# Patient Record
Sex: MALE | Race: WHITE | NOT HISPANIC OR LATINO | Employment: OTHER | URBAN - METROPOLITAN AREA
[De-identification: names, ages, dates, MRNs, and addresses within clinical notes are randomized per-mention and may not be internally consistent; named-entity substitution may affect disease eponyms.]

---

## 2019-06-03 ENCOUNTER — RECORDS - HEALTHEAST (OUTPATIENT)
Dept: LAB | Facility: CLINIC | Age: 79
End: 2019-06-03

## 2019-06-03 LAB
ANION GAP SERPL CALCULATED.3IONS-SCNC: 8 MMOL/L (ref 5–18)
BUN SERPL-MCNC: 10 MG/DL (ref 8–28)
CALCIUM SERPL-MCNC: 9.6 MG/DL (ref 8.5–10.5)
CHLORIDE BLD-SCNC: 109 MMOL/L (ref 98–107)
CO2 SERPL-SCNC: 24 MMOL/L (ref 22–31)
CREAT SERPL-MCNC: 0.73 MG/DL (ref 0.7–1.3)
GFR SERPL CREATININE-BSD FRML MDRD: >60 ML/MIN/1.73M2
GLUCOSE BLD-MCNC: 85 MG/DL (ref 70–125)
HGB BLD-MCNC: 11.8 G/DL (ref 14–18)
POTASSIUM BLD-SCNC: 3.8 MMOL/L (ref 3.5–5)
SODIUM SERPL-SCNC: 141 MMOL/L (ref 136–145)

## 2019-06-17 ENCOUNTER — RECORDS - HEALTHEAST (OUTPATIENT)
Dept: LAB | Facility: CLINIC | Age: 79
End: 2019-06-17

## 2019-06-17 LAB
ANION GAP SERPL CALCULATED.3IONS-SCNC: 8 MMOL/L (ref 5–18)
BUN SERPL-MCNC: 25 MG/DL (ref 8–28)
CALCIUM SERPL-MCNC: 9 MG/DL (ref 8.5–10.5)
CHLORIDE BLD-SCNC: 109 MMOL/L (ref 98–107)
CO2 SERPL-SCNC: 23 MMOL/L (ref 22–31)
CREAT SERPL-MCNC: 0.75 MG/DL (ref 0.7–1.3)
GFR SERPL CREATININE-BSD FRML MDRD: >60 ML/MIN/1.73M2
GLUCOSE BLD-MCNC: 81 MG/DL (ref 70–125)
POTASSIUM BLD-SCNC: 4 MMOL/L (ref 3.5–5)
SODIUM SERPL-SCNC: 140 MMOL/L (ref 136–145)

## 2019-06-26 ENCOUNTER — RECORDS - HEALTHEAST (OUTPATIENT)
Dept: LAB | Facility: CLINIC | Age: 79
End: 2019-06-26

## 2019-06-26 LAB — INR PPP: 1.17 (ref 0.9–1.1)

## 2019-06-27 ENCOUNTER — RECORDS - HEALTHEAST (OUTPATIENT)
Dept: LAB | Facility: CLINIC | Age: 79
End: 2019-06-27

## 2019-06-27 LAB
ANION GAP SERPL CALCULATED.3IONS-SCNC: 9 MMOL/L (ref 5–18)
BUN SERPL-MCNC: 25 MG/DL (ref 8–28)
CALCIUM SERPL-MCNC: 9 MG/DL (ref 8.5–10.5)
CHLORIDE BLD-SCNC: 108 MMOL/L (ref 98–107)
CO2 SERPL-SCNC: 21 MMOL/L (ref 22–31)
CREAT SERPL-MCNC: 0.91 MG/DL (ref 0.7–1.3)
GFR SERPL CREATININE-BSD FRML MDRD: >60 ML/MIN/1.73M2
GLUCOSE BLD-MCNC: 90 MG/DL (ref 70–125)
HGB BLD-MCNC: 11.4 G/DL (ref 14–18)
INR PPP: 1.13 (ref 0.9–1.1)
POTASSIUM BLD-SCNC: 4.1 MMOL/L (ref 3.5–5)
SODIUM SERPL-SCNC: 138 MMOL/L (ref 136–145)

## 2019-06-28 ENCOUNTER — RECORDS - HEALTHEAST (OUTPATIENT)
Dept: LAB | Facility: CLINIC | Age: 79
End: 2019-06-28

## 2019-06-28 LAB — INR PPP: 1.28 (ref 0.9–1.1)

## 2019-06-30 ENCOUNTER — RECORDS - HEALTHEAST (OUTPATIENT)
Dept: LAB | Facility: CLINIC | Age: 79
End: 2019-06-30

## 2019-06-30 LAB — INR PPP: 2.67 (ref 0.9–1.1)

## 2019-07-01 ENCOUNTER — RECORDS - HEALTHEAST (OUTPATIENT)
Dept: LAB | Facility: CLINIC | Age: 79
End: 2019-07-01

## 2019-07-01 LAB — INR PPP: 3.31 (ref 0.9–1.1)

## 2019-07-02 ENCOUNTER — RECORDS - HEALTHEAST (OUTPATIENT)
Dept: LAB | Facility: CLINIC | Age: 79
End: 2019-07-02

## 2019-07-02 LAB — INR PPP: 3.02 (ref 0.9–1.1)

## 2019-07-05 ENCOUNTER — RECORDS - HEALTHEAST (OUTPATIENT)
Dept: LAB | Facility: CLINIC | Age: 79
End: 2019-07-05

## 2019-07-05 LAB — INR PPP: 3.42 (ref 0.9–1.1)

## 2019-07-08 ENCOUNTER — RECORDS - HEALTHEAST (OUTPATIENT)
Dept: LAB | Facility: CLINIC | Age: 79
End: 2019-07-08

## 2019-07-09 LAB — INR PPP: 3.46 (ref 0.9–1.1)

## 2019-07-10 ENCOUNTER — RECORDS - HEALTHEAST (OUTPATIENT)
Dept: LAB | Facility: CLINIC | Age: 79
End: 2019-07-10

## 2019-07-10 LAB
ANION GAP SERPL CALCULATED.3IONS-SCNC: 9 MMOL/L (ref 5–18)
BUN SERPL-MCNC: 22 MG/DL (ref 8–28)
CALCIUM SERPL-MCNC: 9.2 MG/DL (ref 8.5–10.5)
CHLORIDE BLD-SCNC: 107 MMOL/L (ref 98–107)
CO2 SERPL-SCNC: 24 MMOL/L (ref 22–31)
CREAT SERPL-MCNC: 0.76 MG/DL (ref 0.7–1.3)
GFR SERPL CREATININE-BSD FRML MDRD: >60 ML/MIN/1.73M2
GLUCOSE BLD-MCNC: 83 MG/DL (ref 70–125)
INR PPP: 2.77 (ref 0.9–1.1)
POTASSIUM BLD-SCNC: 3.9 MMOL/L (ref 3.5–5)
SODIUM SERPL-SCNC: 140 MMOL/L (ref 136–145)

## 2019-07-12 ENCOUNTER — RECORDS - HEALTHEAST (OUTPATIENT)
Dept: LAB | Facility: CLINIC | Age: 79
End: 2019-07-12

## 2019-07-12 LAB — INR PPP: 1.96 (ref 0.9–1.1)

## 2019-07-15 ENCOUNTER — RECORDS - HEALTHEAST (OUTPATIENT)
Dept: LAB | Facility: CLINIC | Age: 79
End: 2019-07-15

## 2019-07-15 LAB — INR PPP: 2.29 (ref 0.9–1.1)

## 2019-07-16 ENCOUNTER — RECORDS - HEALTHEAST (OUTPATIENT)
Dept: LAB | Facility: CLINIC | Age: 79
End: 2019-07-16

## 2019-07-16 LAB — INR PPP: 2.19 (ref 0.9–1.1)

## 2019-07-18 ENCOUNTER — RECORDS - HEALTHEAST (OUTPATIENT)
Dept: LAB | Facility: CLINIC | Age: 79
End: 2019-07-18

## 2019-07-19 LAB — INR PPP: 2.32 (ref 0.9–1.1)

## 2019-07-25 ENCOUNTER — RECORDS - HEALTHEAST (OUTPATIENT)
Dept: LAB | Facility: CLINIC | Age: 79
End: 2019-07-25

## 2019-07-26 LAB — INR PPP: 1.75 (ref 0.9–1.1)

## 2019-07-29 ENCOUNTER — RECORDS - HEALTHEAST (OUTPATIENT)
Dept: LAB | Facility: CLINIC | Age: 79
End: 2019-07-29

## 2019-07-29 LAB — INR PPP: 1.77 (ref 0.9–1.1)

## 2019-07-31 ENCOUNTER — RECORDS - HEALTHEAST (OUTPATIENT)
Dept: LAB | Facility: CLINIC | Age: 79
End: 2019-07-31

## 2019-08-01 LAB — INR PPP: 1.95 (ref 0.9–1.1)

## 2019-08-05 ENCOUNTER — RECORDS - HEALTHEAST (OUTPATIENT)
Dept: LAB | Facility: CLINIC | Age: 79
End: 2019-08-05

## 2019-08-06 LAB — INR PPP: 1.86 (ref 0.9–1.1)

## 2019-08-07 ENCOUNTER — RECORDS - HEALTHEAST (OUTPATIENT)
Dept: LAB | Facility: CLINIC | Age: 79
End: 2019-08-07

## 2019-08-07 LAB
ANION GAP SERPL CALCULATED.3IONS-SCNC: 6 MMOL/L (ref 5–18)
BUN SERPL-MCNC: 22 MG/DL (ref 8–28)
CALCIUM SERPL-MCNC: 8.6 MG/DL (ref 8.5–10.5)
CHLORIDE BLD-SCNC: 110 MMOL/L (ref 98–107)
CO2 SERPL-SCNC: 24 MMOL/L (ref 22–31)
CREAT SERPL-MCNC: 0.73 MG/DL (ref 0.7–1.3)
GFR SERPL CREATININE-BSD FRML MDRD: >60 ML/MIN/1.73M2
GLUCOSE BLD-MCNC: 96 MG/DL (ref 70–125)
HGB BLD-MCNC: 10.4 G/DL (ref 14–18)
POTASSIUM BLD-SCNC: 4 MMOL/L (ref 3.5–5)
SODIUM SERPL-SCNC: 140 MMOL/L (ref 136–145)

## 2019-08-12 ENCOUNTER — RECORDS - HEALTHEAST (OUTPATIENT)
Dept: LAB | Facility: CLINIC | Age: 79
End: 2019-08-12

## 2019-08-13 LAB — INR PPP: 2.2 (ref 0.9–1.1)

## 2019-08-19 ENCOUNTER — RECORDS - HEALTHEAST (OUTPATIENT)
Dept: LAB | Facility: CLINIC | Age: 79
End: 2019-08-19

## 2019-08-20 LAB — INR PPP: 2.46 (ref 0.9–1.1)

## 2019-08-26 ENCOUNTER — RECORDS - HEALTHEAST (OUTPATIENT)
Dept: LAB | Facility: CLINIC | Age: 79
End: 2019-08-26

## 2019-08-27 LAB — INR PPP: 2.48 (ref 0.9–1.1)

## 2019-08-30 ENCOUNTER — RECORDS - HEALTHEAST (OUTPATIENT)
Dept: LAB | Facility: CLINIC | Age: 79
End: 2019-08-30

## 2019-09-03 LAB — INR PPP: 2.32 (ref 0.9–1.1)

## 2019-09-10 ENCOUNTER — RECORDS - HEALTHEAST (OUTPATIENT)
Dept: LAB | Facility: CLINIC | Age: 79
End: 2019-09-10

## 2019-09-10 LAB — INR PPP: 2.35 (ref 0.9–1.1)

## 2019-09-17 ENCOUNTER — RECORDS - HEALTHEAST (OUTPATIENT)
Dept: LAB | Facility: CLINIC | Age: 79
End: 2019-09-17

## 2019-09-17 LAB — INR PPP: 2.21 (ref 0.9–1.1)

## 2019-09-23 ENCOUNTER — RECORDS - HEALTHEAST (OUTPATIENT)
Dept: LAB | Facility: CLINIC | Age: 79
End: 2019-09-23

## 2019-09-24 LAB — INR PPP: 2.29 (ref 0.9–1.1)

## 2019-10-07 ENCOUNTER — RECORDS - HEALTHEAST (OUTPATIENT)
Dept: LAB | Facility: CLINIC | Age: 79
End: 2019-10-07

## 2019-10-08 LAB — INR PPP: 2.79 (ref 0.9–1.1)

## 2019-10-21 ENCOUNTER — RECORDS - HEALTHEAST (OUTPATIENT)
Dept: LAB | Facility: CLINIC | Age: 79
End: 2019-10-21

## 2019-10-22 LAB — INR PPP: 2.91 (ref 0.9–1.1)

## 2019-11-19 ENCOUNTER — RECORDS - HEALTHEAST (OUTPATIENT)
Dept: LAB | Facility: CLINIC | Age: 79
End: 2019-11-19

## 2019-11-19 LAB — INR PPP: 2.75 (ref 0.9–1.1)

## 2019-12-16 ENCOUNTER — RECORDS - HEALTHEAST (OUTPATIENT)
Dept: LAB | Facility: CLINIC | Age: 79
End: 2019-12-16

## 2019-12-17 LAB — INR PPP: 1.74 (ref 0.9–1.1)

## 2019-12-20 ENCOUNTER — RECORDS - HEALTHEAST (OUTPATIENT)
Dept: LAB | Facility: CLINIC | Age: 79
End: 2019-12-20

## 2019-12-20 LAB — INR PPP: 1.71 (ref 0.9–1.1)

## 2020-01-06 ENCOUNTER — RECORDS - HEALTHEAST (OUTPATIENT)
Dept: LAB | Facility: CLINIC | Age: 80
End: 2020-01-06

## 2020-01-07 LAB — INR PPP: 2.91 (ref 0.9–1.1)

## 2020-01-20 ENCOUNTER — RECORDS - HEALTHEAST (OUTPATIENT)
Dept: LAB | Facility: CLINIC | Age: 80
End: 2020-01-20

## 2020-01-21 LAB
25(OH)D3 SERPL-MCNC: 52.4 NG/ML (ref 30–80)
ANION GAP SERPL CALCULATED.3IONS-SCNC: 6 MMOL/L (ref 5–18)
BASOPHILS # BLD AUTO: 0.1 THOU/UL (ref 0–0.2)
BASOPHILS NFR BLD AUTO: 1 % (ref 0–2)
BNP SERPL-MCNC: 49 PG/ML (ref 0–88)
BUN SERPL-MCNC: 19 MG/DL (ref 8–28)
CALCIUM SERPL-MCNC: 8.9 MG/DL (ref 8.5–10.5)
CHLORIDE BLD-SCNC: 106 MMOL/L (ref 98–107)
CHOLEST SERPL-MCNC: 138 MG/DL
CO2 SERPL-SCNC: 27 MMOL/L (ref 22–31)
CREAT SERPL-MCNC: 0.87 MG/DL (ref 0.7–1.3)
EOSINOPHIL # BLD AUTO: 1.1 THOU/UL (ref 0–0.4)
EOSINOPHIL NFR BLD AUTO: 12 % (ref 0–6)
ERYTHROCYTE [DISTWIDTH] IN BLOOD BY AUTOMATED COUNT: 15.1 % (ref 11–14.5)
FASTING STATUS PATIENT QL REPORTED: NORMAL
GFR SERPL CREATININE-BSD FRML MDRD: >60 ML/MIN/1.73M2
GLUCOSE BLD-MCNC: 90 MG/DL (ref 70–125)
HCT VFR BLD AUTO: 37.7 % (ref 40–54)
HDLC SERPL-MCNC: 44 MG/DL
HGB BLD-MCNC: 12 G/DL (ref 14–18)
LDLC SERPL CALC-MCNC: 79 MG/DL
LYMPHOCYTES # BLD AUTO: 1 THOU/UL (ref 0.8–4.4)
LYMPHOCYTES NFR BLD AUTO: 12 % (ref 20–40)
MCH RBC QN AUTO: 31.7 PG (ref 27–34)
MCHC RBC AUTO-ENTMCNC: 31.8 G/DL (ref 32–36)
MCV RBC AUTO: 100 FL (ref 80–100)
MONOCYTES # BLD AUTO: 0.9 THOU/UL (ref 0–0.9)
MONOCYTES NFR BLD AUTO: 10 % (ref 2–10)
NEUTROPHILS # BLD AUTO: 5.5 THOU/UL (ref 2–7.7)
NEUTROPHILS NFR BLD AUTO: 64 % (ref 50–70)
PLATELET # BLD AUTO: 324 THOU/UL (ref 140–440)
PMV BLD AUTO: 10.7 FL (ref 8.5–12.5)
POTASSIUM BLD-SCNC: 4.7 MMOL/L (ref 3.5–5)
RBC # BLD AUTO: 3.79 MILL/UL (ref 4.4–6.2)
SODIUM SERPL-SCNC: 139 MMOL/L (ref 136–145)
TRIGL SERPL-MCNC: 75 MG/DL
TSH SERPL DL<=0.005 MIU/L-ACNC: 5.58 UIU/ML (ref 0.3–5)
WBC: 8.5 THOU/UL (ref 4–11)

## 2020-02-03 ENCOUNTER — RECORDS - HEALTHEAST (OUTPATIENT)
Dept: LAB | Facility: CLINIC | Age: 80
End: 2020-02-03

## 2020-02-04 LAB
INR PPP: 3.28 (ref 0.9–1.1)
T4 FREE SERPL-MCNC: 0.8 NG/DL (ref 0.7–1.8)
TSH SERPL DL<=0.005 MIU/L-ACNC: 4.88 UIU/ML (ref 0.3–5)

## 2020-02-10 ENCOUNTER — RECORDS - HEALTHEAST (OUTPATIENT)
Dept: LAB | Facility: CLINIC | Age: 80
End: 2020-02-10

## 2020-02-11 LAB — INR PPP: 3.02 (ref 0.9–1.1)

## 2020-03-09 ENCOUNTER — RECORDS - HEALTHEAST (OUTPATIENT)
Dept: LAB | Facility: CLINIC | Age: 80
End: 2020-03-09

## 2020-03-10 LAB — INR PPP: 2.98 (ref 0.9–1.1)

## 2020-04-07 ENCOUNTER — RECORDS - HEALTHEAST (OUTPATIENT)
Dept: LAB | Facility: CLINIC | Age: 80
End: 2020-04-07

## 2020-04-07 LAB — INR PPP: 4.04 (ref 0.9–1.1)

## 2020-04-08 ENCOUNTER — RECORDS - HEALTHEAST (OUTPATIENT)
Dept: LAB | Facility: CLINIC | Age: 80
End: 2020-04-08

## 2020-04-09 LAB — INR PPP: 2.33 (ref 0.9–1.1)

## 2020-04-13 ENCOUNTER — RECORDS - HEALTHEAST (OUTPATIENT)
Dept: LAB | Facility: CLINIC | Age: 80
End: 2020-04-13

## 2020-04-13 LAB — INR PPP: 2.83 (ref 0.9–1.1)

## 2020-04-15 ENCOUNTER — RECORDS - HEALTHEAST (OUTPATIENT)
Dept: LAB | Facility: CLINIC | Age: 80
End: 2020-04-15

## 2020-04-16 LAB — INR PPP: 2.98 (ref 0.9–1.1)

## 2020-04-22 ENCOUNTER — RECORDS - HEALTHEAST (OUTPATIENT)
Dept: LAB | Facility: CLINIC | Age: 80
End: 2020-04-22

## 2020-04-23 LAB — INR PPP: 2.82 (ref 0.9–1.1)

## 2020-04-29 ENCOUNTER — RECORDS - HEALTHEAST (OUTPATIENT)
Dept: LAB | Facility: CLINIC | Age: 80
End: 2020-04-29

## 2020-04-30 LAB — INR PPP: 2.74 (ref 0.9–1.1)

## 2020-05-14 ENCOUNTER — RECORDS - HEALTHEAST (OUTPATIENT)
Dept: LAB | Facility: CLINIC | Age: 80
End: 2020-05-14

## 2020-05-14 LAB — INR PPP: 2.43 (ref 0.9–1.1)

## 2020-05-28 ENCOUNTER — RECORDS - HEALTHEAST (OUTPATIENT)
Dept: LAB | Facility: CLINIC | Age: 80
End: 2020-05-28

## 2020-05-28 LAB — INR PPP: 2.08 (ref 0.9–1.1)

## 2020-06-17 ENCOUNTER — RECORDS - HEALTHEAST (OUTPATIENT)
Dept: LAB | Facility: CLINIC | Age: 80
End: 2020-06-17

## 2020-06-18 LAB — INR PPP: 1.92 (ref 0.9–1.1)

## 2020-07-09 ENCOUNTER — RECORDS - HEALTHEAST (OUTPATIENT)
Dept: LAB | Facility: CLINIC | Age: 80
End: 2020-07-09

## 2020-07-09 LAB — INR PPP: 2.37 (ref 0.9–1.1)

## 2020-07-29 ENCOUNTER — RECORDS - HEALTHEAST (OUTPATIENT)
Dept: LAB | Facility: CLINIC | Age: 80
End: 2020-07-29

## 2020-07-30 ENCOUNTER — RECORDS - HEALTHEAST (OUTPATIENT)
Dept: LAB | Facility: CLINIC | Age: 80
End: 2020-07-30

## 2020-07-30 LAB — INR PPP: 2.25 (ref 0.9–1.1)

## 2020-07-31 LAB — INR PPP: 2.43 (ref 0.9–1.1)

## 2020-08-20 ENCOUNTER — RECORDS - HEALTHEAST (OUTPATIENT)
Dept: LAB | Facility: CLINIC | Age: 80
End: 2020-08-20

## 2020-08-21 LAB — INR PPP: 2.71 (ref 0.9–1.1)

## 2020-08-27 ENCOUNTER — RECORDS - HEALTHEAST (OUTPATIENT)
Dept: LAB | Facility: CLINIC | Age: 80
End: 2020-08-27

## 2020-08-28 LAB
ANION GAP SERPL CALCULATED.3IONS-SCNC: 9 MMOL/L (ref 5–18)
BASOPHILS # BLD AUTO: 0.1 THOU/UL (ref 0–0.2)
BASOPHILS NFR BLD AUTO: 1 % (ref 0–2)
BNP SERPL-MCNC: 66 PG/ML (ref 0–88)
BUN SERPL-MCNC: 20 MG/DL (ref 8–28)
CALCIUM SERPL-MCNC: 9 MG/DL (ref 8.5–10.5)
CHLORIDE BLD-SCNC: 104 MMOL/L (ref 98–107)
CO2 SERPL-SCNC: 25 MMOL/L (ref 22–31)
CREAT SERPL-MCNC: 0.93 MG/DL (ref 0.7–1.3)
EOSINOPHIL # BLD AUTO: 1.3 THOU/UL (ref 0–0.4)
EOSINOPHIL NFR BLD AUTO: 12 % (ref 0–6)
ERYTHROCYTE [DISTWIDTH] IN BLOOD BY AUTOMATED COUNT: 14.2 % (ref 11–14.5)
GFR SERPL CREATININE-BSD FRML MDRD: >60 ML/MIN/1.73M2
GLUCOSE BLD-MCNC: 91 MG/DL (ref 70–125)
HCT VFR BLD AUTO: 39.8 % (ref 40–54)
HGB BLD-MCNC: 12.5 G/DL (ref 14–18)
IMM GRANULOCYTES # BLD: 0 THOU/UL
IMM GRANULOCYTES NFR BLD: 0 %
LYMPHOCYTES # BLD AUTO: 1.2 THOU/UL (ref 0.8–4.4)
LYMPHOCYTES NFR BLD AUTO: 11 % (ref 20–40)
MCH RBC QN AUTO: 31 PG (ref 27–34)
MCHC RBC AUTO-ENTMCNC: 31.4 G/DL (ref 32–36)
MCV RBC AUTO: 99 FL (ref 80–100)
MONOCYTES # BLD AUTO: 0.9 THOU/UL (ref 0–0.9)
MONOCYTES NFR BLD AUTO: 9 % (ref 2–10)
NEUTROPHILS # BLD AUTO: 7 THOU/UL (ref 2–7.7)
NEUTROPHILS NFR BLD AUTO: 67 % (ref 50–70)
PLATELET # BLD AUTO: 355 THOU/UL (ref 140–440)
PMV BLD AUTO: 10.9 FL (ref 8.5–12.5)
POTASSIUM BLD-SCNC: 4.3 MMOL/L (ref 3.5–5)
RBC # BLD AUTO: 4.03 MILL/UL (ref 4.4–6.2)
SODIUM SERPL-SCNC: 138 MMOL/L (ref 136–145)
TSH SERPL DL<=0.005 MIU/L-ACNC: 3.66 UIU/ML (ref 0.3–5)
WBC: 10.5 THOU/UL (ref 4–11)

## 2020-09-10 ENCOUNTER — RECORDS - HEALTHEAST (OUTPATIENT)
Dept: LAB | Facility: CLINIC | Age: 80
End: 2020-09-10

## 2020-09-11 LAB — INR PPP: 2.55 (ref 0.9–1.1)

## 2020-09-24 ENCOUNTER — RECORDS - HEALTHEAST (OUTPATIENT)
Dept: LAB | Facility: CLINIC | Age: 80
End: 2020-09-24

## 2020-09-25 LAB — INR PPP: 1.46 (ref 0.9–1.1)

## 2020-09-27 ENCOUNTER — RECORDS - HEALTHEAST (OUTPATIENT)
Dept: LAB | Facility: CLINIC | Age: 80
End: 2020-09-27

## 2020-09-28 LAB — INR PPP: 1.89 (ref 0.9–1.1)

## 2020-10-01 ENCOUNTER — RECORDS - HEALTHEAST (OUTPATIENT)
Dept: LAB | Facility: CLINIC | Age: 80
End: 2020-10-01

## 2020-10-02 LAB — INR PPP: 2.1 (ref 0.9–1.1)

## 2020-10-08 ENCOUNTER — RECORDS - HEALTHEAST (OUTPATIENT)
Dept: LAB | Facility: CLINIC | Age: 80
End: 2020-10-08

## 2020-10-09 LAB — INR PPP: 2.92 (ref 0.9–1.1)

## 2020-10-15 ENCOUNTER — RECORDS - HEALTHEAST (OUTPATIENT)
Dept: LAB | Facility: CLINIC | Age: 80
End: 2020-10-15

## 2020-10-16 LAB — INR PPP: 1.98 (ref 0.9–1.1)

## 2020-10-22 ENCOUNTER — RECORDS - HEALTHEAST (OUTPATIENT)
Dept: LAB | Facility: CLINIC | Age: 80
End: 2020-10-22

## 2020-10-23 ENCOUNTER — RECORDS - HEALTHEAST (OUTPATIENT)
Dept: LAB | Facility: CLINIC | Age: 80
End: 2020-10-23

## 2020-10-23 LAB — INR PPP: 1.65 (ref 0.9–1.1)

## 2020-10-26 LAB — INR PPP: 2.11 (ref 0.9–1.1)

## 2020-11-02 ENCOUNTER — RECORDS - HEALTHEAST (OUTPATIENT)
Dept: LAB | Facility: CLINIC | Age: 80
End: 2020-11-02

## 2020-11-03 LAB — INR PPP: 1.97 (ref 0.9–1.1)

## 2020-11-09 ENCOUNTER — RECORDS - HEALTHEAST (OUTPATIENT)
Dept: LAB | Facility: CLINIC | Age: 80
End: 2020-11-09

## 2020-11-10 LAB — INR PPP: 2.13 (ref 0.9–1.1)

## 2020-11-17 ENCOUNTER — RECORDS - HEALTHEAST (OUTPATIENT)
Dept: LAB | Facility: CLINIC | Age: 80
End: 2020-11-17

## 2020-11-18 LAB — INR PPP: 2.45 (ref 0.9–1.1)

## 2020-12-01 ENCOUNTER — RECORDS - HEALTHEAST (OUTPATIENT)
Dept: LAB | Facility: CLINIC | Age: 80
End: 2020-12-01

## 2020-12-02 ENCOUNTER — RECORDS - HEALTHEAST (OUTPATIENT)
Dept: LAB | Facility: CLINIC | Age: 80
End: 2020-12-02

## 2020-12-02 LAB — SODIUM SERPL-SCNC: 138 MMOL/L (ref 136–145)

## 2020-12-03 LAB — INR PPP: 2.29 (ref 0.9–1.1)

## 2020-12-16 ENCOUNTER — RECORDS - HEALTHEAST (OUTPATIENT)
Dept: LAB | Facility: CLINIC | Age: 80
End: 2020-12-16

## 2020-12-17 LAB
ANION GAP SERPL CALCULATED.3IONS-SCNC: 8 MMOL/L (ref 5–18)
BASOPHILS # BLD AUTO: 0.1 THOU/UL (ref 0–0.2)
BASOPHILS NFR BLD AUTO: 1 % (ref 0–2)
BUN SERPL-MCNC: 26 MG/DL (ref 8–28)
CALCIUM SERPL-MCNC: 8.6 MG/DL (ref 8.5–10.5)
CHLORIDE BLD-SCNC: 110 MMOL/L (ref 98–107)
CO2 SERPL-SCNC: 23 MMOL/L (ref 22–31)
CREAT SERPL-MCNC: 0.8 MG/DL (ref 0.7–1.3)
EOSINOPHIL # BLD AUTO: 1.5 THOU/UL (ref 0–0.4)
EOSINOPHIL NFR BLD AUTO: 15 % (ref 0–6)
ERYTHROCYTE [DISTWIDTH] IN BLOOD BY AUTOMATED COUNT: 14.5 % (ref 11–14.5)
GFR SERPL CREATININE-BSD FRML MDRD: >60 ML/MIN/1.73M2
GLUCOSE BLD-MCNC: 96 MG/DL (ref 70–125)
HCT VFR BLD AUTO: 37.1 % (ref 40–54)
HGB BLD-MCNC: 11.7 G/DL (ref 14–18)
IMM GRANULOCYTES # BLD: 0 THOU/UL
IMM GRANULOCYTES NFR BLD: 0 %
INR PPP: 2.8 (ref 0.9–1.1)
LYMPHOCYTES # BLD AUTO: 1.2 THOU/UL (ref 0.8–4.4)
LYMPHOCYTES NFR BLD AUTO: 13 % (ref 20–40)
MCH RBC QN AUTO: 30.7 PG (ref 27–34)
MCHC RBC AUTO-ENTMCNC: 31.5 G/DL (ref 32–36)
MCV RBC AUTO: 97 FL (ref 80–100)
MONOCYTES # BLD AUTO: 0.9 THOU/UL (ref 0–0.9)
MONOCYTES NFR BLD AUTO: 9 % (ref 2–10)
NEUTROPHILS # BLD AUTO: 5.9 THOU/UL (ref 2–7.7)
NEUTROPHILS NFR BLD AUTO: 62 % (ref 50–70)
PLATELET # BLD AUTO: 363 THOU/UL (ref 140–440)
PMV BLD AUTO: 11 FL (ref 8.5–12.5)
POTASSIUM BLD-SCNC: 4.6 MMOL/L (ref 3.5–5)
RBC # BLD AUTO: 3.81 MILL/UL (ref 4.4–6.2)
SODIUM SERPL-SCNC: 141 MMOL/L (ref 136–145)
URATE SERPL-MCNC: 4 MG/DL (ref 3–8)
WBC: 9.6 THOU/UL (ref 4–11)

## 2020-12-28 ENCOUNTER — RECORDS - HEALTHEAST (OUTPATIENT)
Dept: LAB | Facility: CLINIC | Age: 80
End: 2020-12-28

## 2020-12-31 LAB — INR PPP: 2.97 (ref 0.9–1.1)

## 2021-01-11 ENCOUNTER — RECORDS - HEALTHEAST (OUTPATIENT)
Dept: LAB | Facility: CLINIC | Age: 81
End: 2021-01-11

## 2021-01-14 LAB — INR PPP: 2.03 (ref 0.9–1.1)

## 2021-01-26 ENCOUNTER — RECORDS - HEALTHEAST (OUTPATIENT)
Dept: LAB | Facility: CLINIC | Age: 81
End: 2021-01-26

## 2021-01-28 LAB — INR PPP: 1.54 (ref 0.9–1.1)

## 2021-01-30 ENCOUNTER — RECORDS - HEALTHEAST (OUTPATIENT)
Dept: LAB | Facility: CLINIC | Age: 81
End: 2021-01-30

## 2021-02-01 LAB — INR PPP: 1.94 (ref 0.9–1.1)

## 2021-02-03 ENCOUNTER — RECORDS - HEALTHEAST (OUTPATIENT)
Dept: LAB | Facility: CLINIC | Age: 81
End: 2021-02-03

## 2021-02-05 LAB — INR PPP: 2.34 (ref 0.9–1.1)

## 2021-02-08 ENCOUNTER — RECORDS - HEALTHEAST (OUTPATIENT)
Dept: LAB | Facility: CLINIC | Age: 81
End: 2021-02-08

## 2021-02-09 LAB — INR PPP: 3.9 (ref 0.9–1.1)

## 2021-02-11 ENCOUNTER — RECORDS - HEALTHEAST (OUTPATIENT)
Dept: LAB | Facility: CLINIC | Age: 81
End: 2021-02-11

## 2021-02-12 LAB — INR PPP: 3.65 (ref 0.9–1.1)

## 2021-02-13 ENCOUNTER — RECORDS - HEALTHEAST (OUTPATIENT)
Dept: LAB | Facility: CLINIC | Age: 81
End: 2021-02-13

## 2021-02-15 LAB — INR PPP: 2.21 (ref 0.9–1.1)

## 2021-02-18 ENCOUNTER — RECORDS - HEALTHEAST (OUTPATIENT)
Dept: LAB | Facility: CLINIC | Age: 81
End: 2021-02-18

## 2021-02-19 LAB — INR PPP: 2.82 (ref 0.9–1.1)

## 2021-02-24 ENCOUNTER — RECORDS - HEALTHEAST (OUTPATIENT)
Dept: LAB | Facility: CLINIC | Age: 81
End: 2021-02-24

## 2021-02-26 LAB — INR PPP: 2.5 (ref 0.9–1.1)

## 2021-03-06 ENCOUNTER — RECORDS - HEALTHEAST (OUTPATIENT)
Dept: LAB | Facility: CLINIC | Age: 81
End: 2021-03-06

## 2021-03-08 LAB — INR PPP: 2.6 (ref 0.9–1.1)

## 2021-03-21 ENCOUNTER — RECORDS - HEALTHEAST (OUTPATIENT)
Dept: LAB | Facility: CLINIC | Age: 81
End: 2021-03-21

## 2021-03-23 LAB — INR PPP: 2.36 (ref 0.9–1.1)

## 2021-04-05 ENCOUNTER — RECORDS - HEALTHEAST (OUTPATIENT)
Dept: LAB | Facility: CLINIC | Age: 81
End: 2021-04-05

## 2021-04-06 LAB
INR PPP: 2.31 (ref 0.9–1.1)
URATE SERPL-MCNC: 4.8 MG/DL (ref 3–8)

## 2021-04-26 ENCOUNTER — RECORDS - HEALTHEAST (OUTPATIENT)
Dept: LAB | Facility: CLINIC | Age: 81
End: 2021-04-26

## 2021-04-27 LAB — INR PPP: 2.27 (ref 0.9–1.1)

## 2021-05-16 ENCOUNTER — RECORDS - HEALTHEAST (OUTPATIENT)
Dept: LAB | Facility: CLINIC | Age: 81
End: 2021-05-16

## 2021-05-18 LAB — INR PPP: 2.4 (ref 0.9–1.1)

## 2021-06-12 ENCOUNTER — RECORDS - HEALTHEAST (OUTPATIENT)
Dept: LAB | Facility: CLINIC | Age: 81
End: 2021-06-12

## 2021-06-14 LAB — INR PPP: 2.14 (ref 0.9–1.1)

## 2021-06-16 PROBLEM — I10 ESSENTIAL HYPERTENSION: Status: ACTIVE | Noted: 2019-01-08

## 2021-06-16 PROBLEM — G11.9 CEREBELLAR ATAXIA (H): Status: ACTIVE | Noted: 2019-01-08

## 2021-06-20 ENCOUNTER — RECORDS - HEALTHEAST (OUTPATIENT)
Dept: LAB | Facility: CLINIC | Age: 81
End: 2021-06-20

## 2021-06-22 LAB
ANION GAP SERPL CALCULATED.3IONS-SCNC: 8 MMOL/L (ref 5–18)
BASOPHILS # BLD AUTO: 0.1 THOU/UL (ref 0–0.2)
BASOPHILS NFR BLD AUTO: 1 % (ref 0–2)
BUN SERPL-MCNC: 20 MG/DL (ref 8–28)
CALCIUM SERPL-MCNC: 8.8 MG/DL (ref 8.5–10.5)
CHLORIDE BLD-SCNC: 107 MMOL/L (ref 98–107)
CO2 SERPL-SCNC: 25 MMOL/L (ref 22–31)
CREAT SERPL-MCNC: 0.92 MG/DL (ref 0.7–1.3)
EOSINOPHIL # BLD AUTO: 1.1 THOU/UL (ref 0–0.4)
EOSINOPHIL NFR BLD AUTO: 12 % (ref 0–6)
ERYTHROCYTE [DISTWIDTH] IN BLOOD BY AUTOMATED COUNT: 13.7 % (ref 11–14.5)
GFR SERPL CREATININE-BSD FRML MDRD: >60 ML/MIN/1.73M2
GLUCOSE BLD-MCNC: 93 MG/DL (ref 70–125)
HCT VFR BLD AUTO: 37.3 % (ref 40–54)
HGB BLD-MCNC: 12.2 G/DL (ref 14–18)
IMM GRANULOCYTES # BLD: 0 THOU/UL
IMM GRANULOCYTES NFR BLD: 0 %
INR PPP: 1.9 (ref 0.9–1.1)
LYMPHOCYTES # BLD AUTO: 1.1 THOU/UL (ref 0.8–4.4)
LYMPHOCYTES NFR BLD AUTO: 12 % (ref 20–40)
MCH RBC QN AUTO: 31.5 PG (ref 27–34)
MCHC RBC AUTO-ENTMCNC: 32.7 G/DL (ref 32–36)
MCV RBC AUTO: 96 FL (ref 80–100)
MONOCYTES # BLD AUTO: 1.1 THOU/UL (ref 0–0.9)
MONOCYTES NFR BLD AUTO: 11 % (ref 2–10)
NEUTROPHILS # BLD AUTO: 6 THOU/UL (ref 2–7.7)
NEUTROPHILS NFR BLD AUTO: 64 % (ref 50–70)
PLATELET # BLD AUTO: 339 THOU/UL (ref 140–440)
PMV BLD AUTO: 10.5 FL (ref 8.5–12.5)
POTASSIUM BLD-SCNC: 4.5 MMOL/L (ref 3.5–5)
RBC # BLD AUTO: 3.87 MILL/UL (ref 4.4–6.2)
SODIUM SERPL-SCNC: 140 MMOL/L (ref 136–145)
URATE SERPL-MCNC: 5.8 MG/DL (ref 3–8)
WBC: 9.4 THOU/UL (ref 4–11)

## 2021-08-14 ENCOUNTER — LAB REQUISITION (OUTPATIENT)
Dept: LAB | Facility: CLINIC | Age: 81
End: 2021-08-14
Payer: MEDICARE

## 2021-08-14 DIAGNOSIS — B02.0 ZOSTER ENCEPHALITIS: ICD-10-CM

## 2021-08-14 DIAGNOSIS — D64.9 ANEMIA, UNSPECIFIED: ICD-10-CM

## 2021-08-17 ENCOUNTER — LAB REQUISITION (OUTPATIENT)
Dept: LAB | Facility: CLINIC | Age: 81
End: 2021-08-17
Payer: MEDICARE

## 2021-08-17 DIAGNOSIS — D64.9 ANEMIA, UNSPECIFIED: ICD-10-CM

## 2021-08-17 DIAGNOSIS — B02.0 ZOSTER ENCEPHALITIS: ICD-10-CM

## 2021-08-17 LAB
BASOPHILS # BLD AUTO: 0.1 10E3/UL (ref 0–0.2)
BASOPHILS NFR BLD AUTO: 1 %
EOSINOPHIL # BLD AUTO: 1.3 10E3/UL (ref 0–0.7)
EOSINOPHIL NFR BLD AUTO: 13 %
ERYTHROCYTE [DISTWIDTH] IN BLOOD BY AUTOMATED COUNT: 13.7 % (ref 10–15)
HCT VFR BLD AUTO: 36 % (ref 40–53)
HGB BLD-MCNC: 11.7 G/DL (ref 13.3–17.7)
IMM GRANULOCYTES # BLD: 0 10E3/UL
IMM GRANULOCYTES NFR BLD: 0 %
LYMPHOCYTES # BLD AUTO: 1.1 10E3/UL (ref 0.8–5.3)
LYMPHOCYTES NFR BLD AUTO: 12 %
MCH RBC QN AUTO: 31.2 PG (ref 26.5–33)
MCHC RBC AUTO-ENTMCNC: 32.5 G/DL (ref 31.5–36.5)
MCV RBC AUTO: 96 FL (ref 78–100)
MONOCYTES # BLD AUTO: 1 10E3/UL (ref 0–1.3)
MONOCYTES NFR BLD AUTO: 10 %
NEUTROPHILS # BLD AUTO: 6.2 10E3/UL (ref 1.6–8.3)
NEUTROPHILS NFR BLD AUTO: 64 %
NRBC # BLD AUTO: 0 10E3/UL
NRBC BLD AUTO-RTO: 0 /100
PLATELET # BLD AUTO: 353 10E3/UL (ref 150–450)
RBC # BLD AUTO: 3.75 10E6/UL (ref 4.4–5.9)
WBC # BLD AUTO: 9.7 10E3/UL (ref 4–11)

## 2021-08-17 PROCEDURE — 36415 COLL VENOUS BLD VENIPUNCTURE: CPT | Mod: ORL | Performed by: NURSE PRACTITIONER

## 2021-08-17 PROCEDURE — 85025 COMPLETE CBC W/AUTO DIFF WBC: CPT | Mod: ORL | Performed by: NURSE PRACTITIONER

## 2021-08-17 PROCEDURE — P9603 ONE-WAY ALLOW PRORATED MILES: HCPCS | Mod: ORL | Performed by: NURSE PRACTITIONER

## 2021-08-18 LAB
ALBUMIN SERPL-MCNC: 3.3 G/DL (ref 3.5–5)
ALP SERPL-CCNC: 61 U/L (ref 45–120)
ALT SERPL W P-5'-P-CCNC: 18 U/L (ref 0–45)
AST SERPL W P-5'-P-CCNC: 22 U/L (ref 0–40)
BILIRUB DIRECT SERPL-MCNC: 0.1 MG/DL
BILIRUB SERPL-MCNC: 0.3 MG/DL (ref 0–1)
PROT SERPL-MCNC: 6.7 G/DL (ref 6–8)

## 2021-08-18 PROCEDURE — 36415 COLL VENOUS BLD VENIPUNCTURE: CPT | Mod: ORL | Performed by: NURSE PRACTITIONER

## 2021-08-18 PROCEDURE — 80076 HEPATIC FUNCTION PANEL: CPT | Mod: ORL | Performed by: NURSE PRACTITIONER

## 2021-08-18 PROCEDURE — P9603 ONE-WAY ALLOW PRORATED MILES: HCPCS | Mod: ORL | Performed by: NURSE PRACTITIONER

## 2021-12-07 ENCOUNTER — MEDICAL CORRESPONDENCE (OUTPATIENT)
Dept: HEALTH INFORMATION MANAGEMENT | Facility: CLINIC | Age: 81
End: 2021-12-07

## 2022-01-01 ENCOUNTER — LAB REQUISITION (OUTPATIENT)
Dept: LAB | Facility: CLINIC | Age: 82
End: 2022-01-01
Payer: MEDICARE

## 2022-01-01 DIAGNOSIS — M1A.00X0 IDIOPATHIC CHRONIC GOUT, UNSPECIFIED SITE, WITHOUT TOPHUS (TOPHI): ICD-10-CM

## 2022-01-01 DIAGNOSIS — D64.9 ANEMIA, UNSPECIFIED: ICD-10-CM

## 2022-01-01 DIAGNOSIS — R29.6 REPEATED FALLS: ICD-10-CM

## 2022-01-01 DIAGNOSIS — I10 ESSENTIAL (PRIMARY) HYPERTENSION: ICD-10-CM

## 2022-01-01 DIAGNOSIS — E78.00 PURE HYPERCHOLESTEROLEMIA, UNSPECIFIED: ICD-10-CM

## 2022-01-01 DIAGNOSIS — M10.9 GOUT, UNSPECIFIED: ICD-10-CM

## 2022-01-01 LAB
ANION GAP SERPL CALCULATED.3IONS-SCNC: 10 MMOL/L (ref 5–18)
BUN SERPL-MCNC: 21 MG/DL (ref 8–28)
CALCIUM SERPL-MCNC: 9.3 MG/DL (ref 8.5–10.5)
CHLORIDE BLD-SCNC: 104 MMOL/L (ref 98–107)
CHOLEST SERPL-MCNC: 144 MG/DL
CO2 SERPL-SCNC: 27 MMOL/L (ref 22–31)
CREAT SERPL-MCNC: 0.9 MG/DL (ref 0.7–1.3)
DEPRECATED CALCIDIOL+CALCIFEROL SERPL-MC: 74 UG/L (ref 20–75)
ERYTHROCYTE [DISTWIDTH] IN BLOOD BY AUTOMATED COUNT: 14.2 % (ref 10–15)
FASTING STATUS PATIENT QL REPORTED: ABNORMAL
GFR SERPL CREATININE-BSD FRML MDRD: 85 ML/MIN/1.73M2
GLUCOSE BLD-MCNC: 74 MG/DL (ref 70–125)
HCT VFR BLD AUTO: 42.4 % (ref 40–53)
HDLC SERPL-MCNC: 39 MG/DL
HGB BLD-MCNC: 13.6 G/DL (ref 13.3–17.7)
LDLC SERPL CALC-MCNC: 78 MG/DL
MCH RBC QN AUTO: 31.6 PG (ref 26.5–33)
MCHC RBC AUTO-ENTMCNC: 32.1 G/DL (ref 31.5–36.5)
MCV RBC AUTO: 98 FL (ref 78–100)
PLATELET # BLD AUTO: 355 10E3/UL (ref 150–450)
POTASSIUM BLD-SCNC: 4.3 MMOL/L (ref 3.5–5)
RBC # BLD AUTO: 4.31 10E6/UL (ref 4.4–5.9)
SODIUM SERPL-SCNC: 141 MMOL/L (ref 136–145)
TRIGL SERPL-MCNC: 134 MG/DL
URATE SERPL-MCNC: 5.3 MG/DL (ref 3–8)
WBC # BLD AUTO: 10.3 10E3/UL (ref 4–11)

## 2022-01-01 PROCEDURE — 82306 VITAMIN D 25 HYDROXY: CPT | Mod: ORL | Performed by: NURSE PRACTITIONER

## 2022-01-01 PROCEDURE — 84550 ASSAY OF BLOOD/URIC ACID: CPT | Mod: ORL | Performed by: NURSE PRACTITIONER

## 2022-01-01 PROCEDURE — 36415 COLL VENOUS BLD VENIPUNCTURE: CPT | Mod: ORL | Performed by: NURSE PRACTITIONER

## 2022-01-01 PROCEDURE — P9604 ONE-WAY ALLOW PRORATED TRIP: HCPCS | Mod: ORL | Performed by: NURSE PRACTITIONER

## 2022-01-01 PROCEDURE — 85027 COMPLETE CBC AUTOMATED: CPT | Mod: ORL | Performed by: NURSE PRACTITIONER

## 2022-01-01 PROCEDURE — 80048 BASIC METABOLIC PNL TOTAL CA: CPT | Mod: ORL | Performed by: NURSE PRACTITIONER

## 2022-01-01 PROCEDURE — 80061 LIPID PANEL: CPT | Mod: ORL | Performed by: NURSE PRACTITIONER

## 2022-01-04 LAB
ALBUMIN SERPL-MCNC: 3.7 G/DL (ref 3.5–5)
ALP SERPL-CCNC: 54 U/L (ref 45–120)
ALT SERPL W P-5'-P-CCNC: 18 U/L (ref 0–45)
ANION GAP SERPL CALCULATED.3IONS-SCNC: 11 MMOL/L (ref 5–18)
ANION GAP SERPL CALCULATED.3IONS-SCNC: 11 MMOL/L (ref 5–18)
AST SERPL W P-5'-P-CCNC: 21 U/L (ref 0–40)
BILIRUB SERPL-MCNC: 0.5 MG/DL (ref 0–1)
BUN SERPL-MCNC: 21 MG/DL (ref 8–28)
BUN SERPL-MCNC: 21 MG/DL (ref 8–28)
CALCIUM SERPL-MCNC: 9.4 MG/DL (ref 8.5–10.5)
CALCIUM SERPL-MCNC: 9.4 MG/DL (ref 8.5–10.5)
CHLORIDE BLD-SCNC: 106 MMOL/L (ref 98–107)
CHLORIDE BLD-SCNC: 106 MMOL/L (ref 98–107)
CO2 SERPL-SCNC: 22 MMOL/L (ref 22–31)
CO2 SERPL-SCNC: 22 MMOL/L (ref 22–31)
CREAT SERPL-MCNC: 0.99 MG/DL (ref 0.7–1.3)
CREAT SERPL-MCNC: 0.99 MG/DL (ref 0.7–1.3)
GFR SERPL CREATININE-BSD FRML MDRD: 76 ML/MIN/1.73M2
GFR SERPL CREATININE-BSD FRML MDRD: 76 ML/MIN/1.73M2
GLUCOSE BLD-MCNC: 93 MG/DL (ref 70–125)
GLUCOSE BLD-MCNC: 93 MG/DL (ref 70–125)
POTASSIUM BLD-SCNC: 4.4 MMOL/L (ref 3.5–5)
POTASSIUM BLD-SCNC: 4.4 MMOL/L (ref 3.5–5)
PROT SERPL-MCNC: 7 G/DL (ref 6–8)
SODIUM SERPL-SCNC: 139 MMOL/L (ref 136–145)
SODIUM SERPL-SCNC: 139 MMOL/L (ref 136–145)
TSH SERPL DL<=0.005 MIU/L-ACNC: 2.95 UIU/ML (ref 0.3–5)
URATE SERPL-MCNC: 5.9 MG/DL (ref 3–8)

## 2022-01-04 PROCEDURE — 36415 COLL VENOUS BLD VENIPUNCTURE: CPT | Mod: ORL | Performed by: NURSE PRACTITIONER

## 2022-01-04 PROCEDURE — 80053 COMPREHEN METABOLIC PANEL: CPT | Mod: ORL | Performed by: NURSE PRACTITIONER

## 2022-01-04 PROCEDURE — 84550 ASSAY OF BLOOD/URIC ACID: CPT | Mod: ORL | Performed by: NURSE PRACTITIONER

## 2022-01-04 PROCEDURE — 84443 ASSAY THYROID STIM HORMONE: CPT | Mod: ORL | Performed by: NURSE PRACTITIONER

## 2022-01-04 PROCEDURE — P9604 ONE-WAY ALLOW PRORATED TRIP: HCPCS | Mod: ORL | Performed by: NURSE PRACTITIONER

## 2023-01-01 ENCOUNTER — APPOINTMENT (OUTPATIENT)
Dept: CT IMAGING | Facility: CLINIC | Age: 83
DRG: 871 | End: 2023-01-01
Attending: EMERGENCY MEDICINE
Payer: MEDICARE

## 2023-01-01 ENCOUNTER — APPOINTMENT (OUTPATIENT)
Dept: SPEECH THERAPY | Facility: CLINIC | Age: 83
DRG: 871 | End: 2023-01-01
Payer: MEDICARE

## 2023-01-01 ENCOUNTER — DOCUMENTATION ONLY (OUTPATIENT)
Dept: OTHER | Facility: CLINIC | Age: 83
End: 2023-01-01
Payer: MEDICARE

## 2023-01-01 ENCOUNTER — APPOINTMENT (OUTPATIENT)
Dept: RADIOLOGY | Facility: CLINIC | Age: 83
DRG: 871 | End: 2023-01-01
Payer: MEDICARE

## 2023-01-01 ENCOUNTER — PATIENT OUTREACH (OUTPATIENT)
Dept: CARE COORDINATION | Facility: CLINIC | Age: 83
End: 2023-01-01
Payer: MEDICARE

## 2023-01-01 ENCOUNTER — MEDICAL CORRESPONDENCE (OUTPATIENT)
Dept: HEALTH INFORMATION MANAGEMENT | Facility: CLINIC | Age: 83
End: 2023-01-01
Payer: MEDICARE

## 2023-01-01 ENCOUNTER — APPOINTMENT (OUTPATIENT)
Dept: RADIOLOGY | Facility: CLINIC | Age: 83
DRG: 871 | End: 2023-01-01
Attending: EMERGENCY MEDICINE
Payer: MEDICARE

## 2023-01-01 ENCOUNTER — APPOINTMENT (OUTPATIENT)
Dept: CT IMAGING | Facility: CLINIC | Age: 83
DRG: 871 | End: 2023-01-01
Payer: MEDICARE

## 2023-01-01 ENCOUNTER — HOSPITAL ENCOUNTER (INPATIENT)
Facility: CLINIC | Age: 83
LOS: 4 days | Discharge: NURSING, REHAB OR LONG-TERM CARE | DRG: 871 | End: 2023-02-08
Attending: EMERGENCY MEDICINE | Admitting: STUDENT IN AN ORGANIZED HEALTH CARE EDUCATION/TRAINING PROGRAM
Payer: MEDICARE

## 2023-01-01 ENCOUNTER — LAB REQUISITION (OUTPATIENT)
Dept: LAB | Facility: CLINIC | Age: 83
End: 2023-01-01
Payer: MEDICARE

## 2023-01-01 VITALS
BODY MASS INDEX: 24.37 KG/M2 | TEMPERATURE: 98.4 F | OXYGEN SATURATION: 97 % | WEIGHT: 195.99 LBS | HEART RATE: 70 BPM | HEIGHT: 75 IN | SYSTOLIC BLOOD PRESSURE: 148 MMHG | DIASTOLIC BLOOD PRESSURE: 70 MMHG | RESPIRATION RATE: 14 BRPM

## 2023-01-01 DIAGNOSIS — G93.40 ACUTE ENCEPHALOPATHY: ICD-10-CM

## 2023-01-01 DIAGNOSIS — E87.20 LACTIC ACIDOSIS: ICD-10-CM

## 2023-01-01 DIAGNOSIS — R68.89 OTHER GENERAL SYMPTOMS AND SIGNS: ICD-10-CM

## 2023-01-01 DIAGNOSIS — B37.0 ORAL THRUSH: ICD-10-CM

## 2023-01-01 DIAGNOSIS — K04.7 DENTAL ABSCESS: Primary | ICD-10-CM

## 2023-01-01 DIAGNOSIS — Z13.228 ENCOUNTER FOR SCREENING FOR OTHER METABOLIC DISORDERS: ICD-10-CM

## 2023-01-01 DIAGNOSIS — A41.9 SEPSIS, DUE TO UNSPECIFIED ORGANISM, UNSPECIFIED WHETHER ACUTE ORGAN DYSFUNCTION PRESENT (H): ICD-10-CM

## 2023-01-01 DIAGNOSIS — Z13.220 ENCOUNTER FOR SCREENING FOR LIPOID DISORDERS: ICD-10-CM

## 2023-01-01 LAB
ALBUMIN SERPL BCG-MCNC: 3.5 G/DL (ref 3.5–5.2)
ALBUMIN SERPL-MCNC: 2.7 G/DL (ref 3.5–5)
ALBUMIN SERPL-MCNC: 2.8 G/DL (ref 3.5–5)
ALBUMIN SERPL-MCNC: 2.8 G/DL (ref 3.5–5)
ALBUMIN SERPL-MCNC: 3 G/DL (ref 3.5–5)
ALBUMIN SERPL-MCNC: 3.6 G/DL (ref 3.5–5)
ALBUMIN UR-MCNC: 30 MG/DL
ALP SERPL-CCNC: 41 U/L (ref 45–120)
ALP SERPL-CCNC: 42 U/L (ref 45–120)
ALP SERPL-CCNC: 43 U/L (ref 45–120)
ALP SERPL-CCNC: 48 U/L (ref 45–120)
ALP SERPL-CCNC: 64 U/L (ref 45–120)
ALP SERPL-CCNC: ABNORMAL U/L
ALT SERPL W P-5'-P-CCNC: 22 U/L (ref 0–45)
ALT SERPL W P-5'-P-CCNC: 40 U/L (ref 0–45)
ALT SERPL W P-5'-P-CCNC: 42 U/L (ref 0–45)
ALT SERPL W P-5'-P-CCNC: 44 U/L (ref 0–45)
ALT SERPL W P-5'-P-CCNC: 45 U/L (ref 0–45)
ALT SERPL W P-5'-P-CCNC: ABNORMAL U/L
ANION GAP SERPL CALCULATED.3IONS-SCNC: 10 MMOL/L (ref 5–18)
ANION GAP SERPL CALCULATED.3IONS-SCNC: 14 MMOL/L (ref 7–15)
ANION GAP SERPL CALCULATED.3IONS-SCNC: 15 MMOL/L (ref 5–18)
ANION GAP SERPL CALCULATED.3IONS-SCNC: 5 MMOL/L (ref 5–18)
ANION GAP SERPL CALCULATED.3IONS-SCNC: 7 MMOL/L (ref 5–18)
ANION GAP SERPL CALCULATED.3IONS-SCNC: 8 MMOL/L (ref 5–18)
APPEARANCE UR: CLEAR
AST SERPL W P-5'-P-CCNC: 108 U/L (ref 0–40)
AST SERPL W P-5'-P-CCNC: 43 U/L (ref 0–40)
AST SERPL W P-5'-P-CCNC: 63 U/L (ref 0–40)
AST SERPL W P-5'-P-CCNC: 74 U/L (ref 0–40)
AST SERPL W P-5'-P-CCNC: 82 U/L (ref 0–40)
AST SERPL W P-5'-P-CCNC: ABNORMAL U/L
ATRIAL RATE - MUSE: 131 BPM
BACTERIA BLD CULT: NO GROWTH
BACTERIA BLD CULT: NO GROWTH
BACTERIA SPEC CULT: NORMAL
BASOPHILS # BLD AUTO: 0.1 10E3/UL (ref 0–0.2)
BASOPHILS # BLD AUTO: 0.1 10E3/UL (ref 0–0.2)
BASOPHILS NFR BLD AUTO: 0 %
BASOPHILS NFR BLD AUTO: 1 %
BILIRUB DIRECT SERPL-MCNC: 0.2 MG/DL
BILIRUB SERPL-MCNC: 0.3 MG/DL (ref 0–1)
BILIRUB SERPL-MCNC: 0.4 MG/DL
BILIRUB SERPL-MCNC: 0.4 MG/DL (ref 0–1)
BILIRUB SERPL-MCNC: 0.4 MG/DL (ref 0–1)
BILIRUB SERPL-MCNC: 0.6 MG/DL (ref 0–1)
BILIRUB SERPL-MCNC: 0.6 MG/DL (ref 0–1)
BILIRUB UR QL STRIP: NEGATIVE
BUN SERPL-MCNC: 12 MG/DL (ref 8–23)
BUN SERPL-MCNC: 14 MG/DL (ref 8–28)
BUN SERPL-MCNC: 17 MG/DL (ref 8–28)
BUN SERPL-MCNC: 19 MG/DL (ref 8–28)
BUN SERPL-MCNC: 8 MG/DL (ref 8–28)
BUN SERPL-MCNC: 8 MG/DL (ref 8–28)
C REACTIVE PROTEIN LHE: 0.4 MG/DL (ref 0–?)
C REACTIVE PROTEIN LHE: 0.6 MG/DL (ref 0–?)
C REACTIVE PROTEIN LHE: 1.2 MG/DL (ref 0–?)
C REACTIVE PROTEIN LHE: 2.4 MG/DL (ref 0–?)
CALCIUM SERPL-MCNC: 7.9 MG/DL (ref 8.5–10.5)
CALCIUM SERPL-MCNC: 7.9 MG/DL (ref 8.5–10.5)
CALCIUM SERPL-MCNC: 8 MG/DL (ref 8.5–10.5)
CALCIUM SERPL-MCNC: 8.2 MG/DL (ref 8.5–10.5)
CALCIUM SERPL-MCNC: 8.9 MG/DL (ref 8.5–10.5)
CALCIUM SERPL-MCNC: 9.2 MG/DL (ref 8.8–10.2)
CHLORIDE BLD-SCNC: 108 MMOL/L (ref 98–107)
CHLORIDE BLD-SCNC: 113 MMOL/L (ref 98–107)
CHLORIDE BLD-SCNC: 116 MMOL/L (ref 98–107)
CHLORIDE BLD-SCNC: 117 MMOL/L (ref 98–107)
CHLORIDE BLD-SCNC: 118 MMOL/L (ref 98–107)
CHLORIDE SERPL-SCNC: 107 MMOL/L (ref 98–107)
CO2 SERPL-SCNC: 16 MMOL/L (ref 22–31)
CO2 SERPL-SCNC: 17 MMOL/L (ref 22–31)
CO2 SERPL-SCNC: 18 MMOL/L (ref 22–31)
COLOR UR AUTO: YELLOW
CREAT SERPL-MCNC: 0.65 MG/DL (ref 0.7–1.3)
CREAT SERPL-MCNC: 0.68 MG/DL (ref 0.7–1.3)
CREAT SERPL-MCNC: 0.77 MG/DL (ref 0.7–1.3)
CREAT SERPL-MCNC: 0.79 MG/DL (ref 0.67–1.17)
CREAT SERPL-MCNC: 0.88 MG/DL (ref 0.7–1.3)
CREAT SERPL-MCNC: 1.24 MG/DL (ref 0.7–1.3)
DEPRECATED HCO3 PLAS-SCNC: 21 MMOL/L (ref 22–29)
DIASTOLIC BLOOD PRESSURE - MUSE: 72 MMHG
EOSINOPHIL # BLD AUTO: 0 10E3/UL (ref 0–0.7)
EOSINOPHIL # BLD AUTO: 0.2 10E3/UL (ref 0–0.7)
EOSINOPHIL NFR BLD AUTO: 0 %
EOSINOPHIL NFR BLD AUTO: 2 %
ERYTHROCYTE [DISTWIDTH] IN BLOOD BY AUTOMATED COUNT: 14.6 % (ref 10–15)
ERYTHROCYTE [DISTWIDTH] IN BLOOD BY AUTOMATED COUNT: 14.7 % (ref 10–15)
ERYTHROCYTE [DISTWIDTH] IN BLOOD BY AUTOMATED COUNT: 14.8 % (ref 10–15)
ERYTHROCYTE [DISTWIDTH] IN BLOOD BY AUTOMATED COUNT: 14.8 % (ref 10–15)
ERYTHROCYTE [DISTWIDTH] IN BLOOD BY AUTOMATED COUNT: 15 % (ref 10–15)
ERYTHROCYTE [DISTWIDTH] IN BLOOD BY AUTOMATED COUNT: 15 % (ref 10–15)
FLUAV RNA SPEC QL NAA+PROBE: NEGATIVE
FLUBV RNA RESP QL NAA+PROBE: NEGATIVE
GFR SERPL CREATININE-BSD FRML MDRD: 57 ML/MIN/1.73M2
GFR SERPL CREATININE-BSD FRML MDRD: 85 ML/MIN/1.73M2
GFR SERPL CREATININE-BSD FRML MDRD: 88 ML/MIN/1.73M2
GFR SERPL CREATININE-BSD FRML MDRD: 88 ML/MIN/1.73M2
GFR SERPL CREATININE-BSD FRML MDRD: >90 ML/MIN/1.73M2
GFR SERPL CREATININE-BSD FRML MDRD: >90 ML/MIN/1.73M2
GLUCOSE BLD-MCNC: 104 MG/DL (ref 70–125)
GLUCOSE BLD-MCNC: 123 MG/DL (ref 70–125)
GLUCOSE BLD-MCNC: 90 MG/DL (ref 70–125)
GLUCOSE BLD-MCNC: 90 MG/DL (ref 70–125)
GLUCOSE BLD-MCNC: 91 MG/DL (ref 70–125)
GLUCOSE SERPL-MCNC: 77 MG/DL (ref 70–99)
GLUCOSE UR STRIP-MCNC: NEGATIVE MG/DL
GROUP A STREP BY PCR: NOT DETECTED
HCT VFR BLD AUTO: 33.2 % (ref 40–53)
HCT VFR BLD AUTO: 33.9 % (ref 40–53)
HCT VFR BLD AUTO: 34 % (ref 40–53)
HCT VFR BLD AUTO: 35 % (ref 40–53)
HCT VFR BLD AUTO: 41.3 % (ref 40–53)
HCT VFR BLD AUTO: 41.7 % (ref 40–53)
HGB BLD-MCNC: 10.6 G/DL (ref 13.3–17.7)
HGB BLD-MCNC: 10.9 G/DL (ref 13.3–17.7)
HGB BLD-MCNC: 11 G/DL (ref 13.3–17.7)
HGB BLD-MCNC: 11.2 G/DL (ref 13.3–17.7)
HGB BLD-MCNC: 13 G/DL (ref 13.3–17.7)
HGB BLD-MCNC: 13.7 G/DL (ref 13.3–17.7)
HGB UR QL STRIP: ABNORMAL
HOLD SPECIMEN: NORMAL
HYALINE CASTS: 4 /LPF
IMM GRANULOCYTES # BLD: 0 10E3/UL
IMM GRANULOCYTES # BLD: 0.2 10E3/UL
IMM GRANULOCYTES NFR BLD: 0 %
IMM GRANULOCYTES NFR BLD: 1 %
INR PPP: 1.29 (ref 0.85–1.15)
INTERPRETATION ECG - MUSE: NORMAL
KETONES UR STRIP-MCNC: ABNORMAL MG/DL
LACTATE SERPL-SCNC: 1 MMOL/L (ref 0.7–2)
LACTATE SERPL-SCNC: 1.7 MMOL/L (ref 0.7–2)
LACTATE SERPL-SCNC: 2.4 MMOL/L (ref 0.7–2)
LEUKOCYTE ESTERASE UR QL STRIP: NEGATIVE
LIPASE SERPL-CCNC: 45 U/L (ref 0–52)
LYMPHOCYTES # BLD AUTO: 0.8 10E3/UL (ref 0.8–5.3)
LYMPHOCYTES # BLD AUTO: 1.3 10E3/UL (ref 0.8–5.3)
LYMPHOCYTES NFR BLD AUTO: 14 %
LYMPHOCYTES NFR BLD AUTO: 3 %
MCH RBC QN AUTO: 31.5 PG (ref 26.5–33)
MCH RBC QN AUTO: 31.7 PG (ref 26.5–33)
MCH RBC QN AUTO: 31.7 PG (ref 26.5–33)
MCH RBC QN AUTO: 31.8 PG (ref 26.5–33)
MCH RBC QN AUTO: 31.9 PG (ref 26.5–33)
MCH RBC QN AUTO: 32.2 PG (ref 26.5–33)
MCHC RBC AUTO-ENTMCNC: 31.2 G/DL (ref 31.5–36.5)
MCHC RBC AUTO-ENTMCNC: 31.9 G/DL (ref 31.5–36.5)
MCHC RBC AUTO-ENTMCNC: 32 G/DL (ref 31.5–36.5)
MCHC RBC AUTO-ENTMCNC: 32.2 G/DL (ref 31.5–36.5)
MCHC RBC AUTO-ENTMCNC: 32.4 G/DL (ref 31.5–36.5)
MCHC RBC AUTO-ENTMCNC: 33.2 G/DL (ref 31.5–36.5)
MCV RBC AUTO: 102 FL (ref 78–100)
MCV RBC AUTO: 96 FL (ref 78–100)
MCV RBC AUTO: 99 FL (ref 78–100)
MONOCYTES # BLD AUTO: 0.8 10E3/UL (ref 0–1.3)
MONOCYTES # BLD AUTO: 1.4 10E3/UL (ref 0–1.3)
MONOCYTES NFR BLD AUTO: 6 %
MONOCYTES NFR BLD AUTO: 8 %
MUCOUS THREADS #/AREA URNS LPF: PRESENT /LPF
NEUTROPHILS # BLD AUTO: 19.6 10E3/UL (ref 1.6–8.3)
NEUTROPHILS # BLD AUTO: 6.8 10E3/UL (ref 1.6–8.3)
NEUTROPHILS NFR BLD AUTO: 75 %
NEUTROPHILS NFR BLD AUTO: 90 %
NITRATE UR QL: NEGATIVE
NRBC # BLD AUTO: 0 10E3/UL
NRBC # BLD AUTO: 0 10E3/UL
NRBC BLD AUTO-RTO: 0 /100
NRBC BLD AUTO-RTO: 0 /100
P AXIS - MUSE: NORMAL DEGREES
PH UR STRIP: 5.5 [PH] (ref 5–7)
PLATELET # BLD AUTO: 288 10E3/UL (ref 150–450)
PLATELET # BLD AUTO: 288 10E3/UL (ref 150–450)
PLATELET # BLD AUTO: 297 10E3/UL (ref 150–450)
PLATELET # BLD AUTO: 312 10E3/UL (ref 150–450)
PLATELET # BLD AUTO: 387 10E3/UL (ref 150–450)
PLATELET # BLD AUTO: 410 10E3/UL (ref 150–450)
POTASSIUM BLD-SCNC: 3.6 MMOL/L (ref 3.5–5)
POTASSIUM BLD-SCNC: 3.7 MMOL/L (ref 3.5–5)
POTASSIUM BLD-SCNC: 4.2 MMOL/L (ref 3.5–5)
POTASSIUM SERPL-SCNC: 5.5 MMOL/L (ref 3.4–5.3)
POTASSIUM SERPL-SCNC: ABNORMAL MMOL/L
PR INTERVAL - MUSE: NORMAL MS
PROCALCITONIN SERPL-MCNC: 0.1 NG/ML (ref 0–0.49)
PROT SERPL-MCNC: 5 G/DL (ref 6–8)
PROT SERPL-MCNC: 5.3 G/DL (ref 6–8)
PROT SERPL-MCNC: 5.5 G/DL (ref 6–8)
PROT SERPL-MCNC: 5.6 G/DL (ref 6–8)
PROT SERPL-MCNC: 6.7 G/DL (ref 6.4–8.3)
PROT SERPL-MCNC: 6.7 G/DL (ref 6–8)
QRS DURATION - MUSE: 128 MS
QT - MUSE: 366 MS
QTC - MUSE: 508 MS
R AXIS - MUSE: 84 DEGREES
RBC # BLD AUTO: 3.34 10E6/UL (ref 4.4–5.9)
RBC # BLD AUTO: 3.42 10E6/UL (ref 4.4–5.9)
RBC # BLD AUTO: 3.44 10E6/UL (ref 4.4–5.9)
RBC # BLD AUTO: 3.55 10E6/UL (ref 4.4–5.9)
RBC # BLD AUTO: 4.09 10E6/UL (ref 4.4–5.9)
RBC # BLD AUTO: 4.3 10E6/UL (ref 4.4–5.9)
RBC URINE: 2 /HPF
RSV RNA SPEC NAA+PROBE: NEGATIVE
SARS-COV-2 RNA RESP QL NAA+PROBE: NEGATIVE
SODIUM SERPL-SCNC: 140 MMOL/L (ref 136–145)
SODIUM SERPL-SCNC: 141 MMOL/L (ref 136–145)
SODIUM SERPL-SCNC: 142 MMOL/L (ref 136–145)
SP GR UR STRIP: 1.02 (ref 1–1.03)
SQUAMOUS EPITHELIAL: <1 /HPF
SYSTOLIC BLOOD PRESSURE - MUSE: 98 MMHG
T AXIS - MUSE: 21 DEGREES
UROBILINOGEN UR STRIP-MCNC: <2 MG/DL
VANCOMYCIN SERPL-MCNC: 13.4 MG/L
VENTRICULAR RATE- MUSE: 116 BPM
WBC # BLD AUTO: 22 10E3/UL (ref 4–11)
WBC # BLD AUTO: 7.8 10E3/UL (ref 4–11)
WBC # BLD AUTO: 8.3 10E3/UL (ref 4–11)
WBC # BLD AUTO: 8.9 10E3/UL (ref 4–11)
WBC # BLD AUTO: 9.1 10E3/UL (ref 4–11)
WBC # BLD AUTO: 9.3 10E3/UL (ref 4–11)
WBC URINE: 2 /HPF

## 2023-01-01 PROCEDURE — 80053 COMPREHEN METABOLIC PANEL: CPT | Performed by: STUDENT IN AN ORGANIZED HEALTH CARE EDUCATION/TRAINING PROGRAM

## 2023-01-01 PROCEDURE — 258N000003 HC RX IP 258 OP 636: Performed by: STUDENT IN AN ORGANIZED HEALTH CARE EDUCATION/TRAINING PROGRAM

## 2023-01-01 PROCEDURE — 250N000011 HC RX IP 250 OP 636: Performed by: STUDENT IN AN ORGANIZED HEALTH CARE EDUCATION/TRAINING PROGRAM

## 2023-01-01 PROCEDURE — 250N000013 HC RX MED GY IP 250 OP 250 PS 637

## 2023-01-01 PROCEDURE — 120N000001 HC R&B MED SURG/OB

## 2023-01-01 PROCEDURE — 73080 X-RAY EXAM OF ELBOW: CPT | Mod: RT

## 2023-01-01 PROCEDURE — 36415 COLL VENOUS BLD VENIPUNCTURE: CPT

## 2023-01-01 PROCEDURE — 80202 ASSAY OF VANCOMYCIN: CPT | Performed by: STUDENT IN AN ORGANIZED HEALTH CARE EDUCATION/TRAINING PROGRAM

## 2023-01-01 PROCEDURE — P9604 ONE-WAY ALLOW PRORATED TRIP: HCPCS | Mod: ORL | Performed by: NURSE PRACTITIONER

## 2023-01-01 PROCEDURE — G1010 CDSM STANSON: HCPCS

## 2023-01-01 PROCEDURE — 36415 COLL VENOUS BLD VENIPUNCTURE: CPT | Performed by: STUDENT IN AN ORGANIZED HEALTH CARE EDUCATION/TRAINING PROGRAM

## 2023-01-01 PROCEDURE — 85025 COMPLETE CBC W/AUTO DIFF WBC: CPT | Performed by: STUDENT IN AN ORGANIZED HEALTH CARE EDUCATION/TRAINING PROGRAM

## 2023-01-01 PROCEDURE — 87040 BLOOD CULTURE FOR BACTERIA: CPT | Performed by: EMERGENCY MEDICINE

## 2023-01-01 PROCEDURE — 99232 SBSQ HOSP IP/OBS MODERATE 35: CPT | Mod: GC | Performed by: STUDENT IN AN ORGANIZED HEALTH CARE EDUCATION/TRAINING PROGRAM

## 2023-01-01 PROCEDURE — 87637 SARSCOV2&INF A&B&RSV AMP PRB: CPT | Performed by: EMERGENCY MEDICINE

## 2023-01-01 PROCEDURE — 85027 COMPLETE CBC AUTOMATED: CPT | Mod: ORL | Performed by: NURSE PRACTITIONER

## 2023-01-01 PROCEDURE — 92610 EVALUATE SWALLOWING FUNCTION: CPT | Mod: GN

## 2023-01-01 PROCEDURE — 250N000013 HC RX MED GY IP 250 OP 250 PS 637: Performed by: STUDENT IN AN ORGANIZED HEALTH CARE EDUCATION/TRAINING PROGRAM

## 2023-01-01 PROCEDURE — 92526 ORAL FUNCTION THERAPY: CPT | Mod: GN

## 2023-01-01 PROCEDURE — 96361 HYDRATE IV INFUSION ADD-ON: CPT

## 2023-01-01 PROCEDURE — 81001 URINALYSIS AUTO W/SCOPE: CPT | Performed by: EMERGENCY MEDICINE

## 2023-01-01 PROCEDURE — 82248 BILIRUBIN DIRECT: CPT | Performed by: EMERGENCY MEDICINE

## 2023-01-01 PROCEDURE — C9113 INJ PANTOPRAZOLE SODIUM, VIA: HCPCS | Performed by: STUDENT IN AN ORGANIZED HEALTH CARE EDUCATION/TRAINING PROGRAM

## 2023-01-01 PROCEDURE — 62270 DX LMBR SPI PNXR: CPT

## 2023-01-01 PROCEDURE — 99285 EMERGENCY DEPT VISIT HI MDM: CPT | Mod: 25,CS

## 2023-01-01 PROCEDURE — 86140 C-REACTIVE PROTEIN: CPT | Performed by: STUDENT IN AN ORGANIZED HEALTH CARE EDUCATION/TRAINING PROGRAM

## 2023-01-01 PROCEDURE — 80053 COMPREHEN METABOLIC PANEL: CPT

## 2023-01-01 PROCEDURE — 87070 CULTURE OTHR SPECIMN AEROBIC: CPT | Performed by: STUDENT IN AN ORGANIZED HEALTH CARE EDUCATION/TRAINING PROGRAM

## 2023-01-01 PROCEDURE — 250N000011 HC RX IP 250 OP 636: Performed by: EMERGENCY MEDICINE

## 2023-01-01 PROCEDURE — 99223 1ST HOSP IP/OBS HIGH 75: CPT | Mod: AI | Performed by: STUDENT IN AN ORGANIZED HEALTH CARE EDUCATION/TRAINING PROGRAM

## 2023-01-01 PROCEDURE — 96374 THER/PROPH/DIAG INJ IV PUSH: CPT | Mod: 59

## 2023-01-01 PROCEDURE — 85027 COMPLETE CBC AUTOMATED: CPT

## 2023-01-01 PROCEDURE — 74177 CT ABD & PELVIS W/CONTRAST: CPT | Mod: MG

## 2023-01-01 PROCEDURE — 87075 CULTR BACTERIA EXCEPT BLOOD: CPT | Performed by: STUDENT IN AN ORGANIZED HEALTH CARE EDUCATION/TRAINING PROGRAM

## 2023-01-01 PROCEDURE — 009U3ZX DRAINAGE OF SPINAL CANAL, PERCUTANEOUS APPROACH, DIAGNOSTIC: ICD-10-PCS | Performed by: EMERGENCY MEDICINE

## 2023-01-01 PROCEDURE — 71045 X-RAY EXAM CHEST 1 VIEW: CPT

## 2023-01-01 PROCEDURE — 83690 ASSAY OF LIPASE: CPT | Performed by: EMERGENCY MEDICINE

## 2023-01-01 PROCEDURE — 85025 COMPLETE CBC W/AUTO DIFF WBC: CPT | Performed by: EMERGENCY MEDICINE

## 2023-01-01 PROCEDURE — 83605 ASSAY OF LACTIC ACID: CPT | Performed by: STUDENT IN AN ORGANIZED HEALTH CARE EDUCATION/TRAINING PROGRAM

## 2023-01-01 PROCEDURE — 258N000003 HC RX IP 258 OP 636: Performed by: EMERGENCY MEDICINE

## 2023-01-01 PROCEDURE — 36415 COLL VENOUS BLD VENIPUNCTURE: CPT | Performed by: EMERGENCY MEDICINE

## 2023-01-01 PROCEDURE — 87651 STREP A DNA AMP PROBE: CPT | Performed by: STUDENT IN AN ORGANIZED HEALTH CARE EDUCATION/TRAINING PROGRAM

## 2023-01-01 PROCEDURE — 36415 COLL VENOUS BLD VENIPUNCTURE: CPT | Mod: ORL | Performed by: NURSE PRACTITIONER

## 2023-01-01 PROCEDURE — 83605 ASSAY OF LACTIC ACID: CPT | Performed by: EMERGENCY MEDICINE

## 2023-01-01 PROCEDURE — 86140 C-REACTIVE PROTEIN: CPT

## 2023-01-01 PROCEDURE — 84155 ASSAY OF PROTEIN SERUM: CPT | Mod: ORL | Performed by: NURSE PRACTITIONER

## 2023-01-01 PROCEDURE — 85610 PROTHROMBIN TIME: CPT | Performed by: EMERGENCY MEDICINE

## 2023-01-01 PROCEDURE — C9803 HOPD COVID-19 SPEC COLLECT: HCPCS

## 2023-01-01 PROCEDURE — 99238 HOSP IP/OBS DSCHRG MGMT 30/<: CPT | Mod: GC | Performed by: STUDENT IN AN ORGANIZED HEALTH CARE EDUCATION/TRAINING PROGRAM

## 2023-01-01 PROCEDURE — 84145 PROCALCITONIN (PCT): CPT | Performed by: EMERGENCY MEDICINE

## 2023-01-01 RX ORDER — NYSTATIN 100000 [USP'U]/G
POWDER TOPICAL DAILY PRN
Status: DISCONTINUED | OUTPATIENT
Start: 2023-01-01 | End: 2023-01-01

## 2023-01-01 RX ORDER — BISACODYL 10 MG
10 SUPPOSITORY, RECTAL RECTAL DAILY PRN
COMMUNITY

## 2023-01-01 RX ORDER — LISINOPRIL 5 MG/1
5 TABLET ORAL DAILY
Status: DISCONTINUED | OUTPATIENT
Start: 2023-01-01 | End: 2023-01-01 | Stop reason: HOSPADM

## 2023-01-01 RX ORDER — SODIUM CHLORIDE 9 MG/ML
INJECTION, SOLUTION INTRAVENOUS CONTINUOUS
Status: DISCONTINUED | OUTPATIENT
Start: 2023-01-01 | End: 2023-01-01 | Stop reason: HOSPADM

## 2023-01-01 RX ORDER — TAMSULOSIN HYDROCHLORIDE 0.4 MG/1
0.4 CAPSULE ORAL DAILY
COMMUNITY

## 2023-01-01 RX ORDER — NYSTATIN 100000/ML
500000 SUSPENSION, ORAL (FINAL DOSE FORM) ORAL 4 TIMES DAILY
Status: DISCONTINUED | OUTPATIENT
Start: 2023-01-01 | End: 2023-01-01 | Stop reason: HOSPADM

## 2023-01-01 RX ORDER — ONDANSETRON 4 MG/1
4 TABLET, ORALLY DISINTEGRATING ORAL EVERY 6 HOURS PRN
Status: DISCONTINUED | OUTPATIENT
Start: 2023-01-01 | End: 2023-01-01 | Stop reason: HOSPADM

## 2023-01-01 RX ORDER — NYSTATIN 100000/ML
500000 SUSPENSION, ORAL (FINAL DOSE FORM) ORAL 4 TIMES DAILY
Status: DISCONTINUED | OUTPATIENT
Start: 2023-01-01 | End: 2023-01-01

## 2023-01-01 RX ORDER — ACETAMINOPHEN 500 MG
1000 TABLET ORAL 3 TIMES DAILY
COMMUNITY

## 2023-01-01 RX ORDER — ASPIRIN 81 MG/1
81 TABLET, CHEWABLE ORAL DAILY
Status: DISCONTINUED | OUTPATIENT
Start: 2023-01-01 | End: 2023-01-01 | Stop reason: HOSPADM

## 2023-01-01 RX ORDER — ESCITALOPRAM OXALATE 10 MG/1
10 TABLET ORAL DAILY
Status: DISCONTINUED | OUTPATIENT
Start: 2023-01-01 | End: 2023-01-01 | Stop reason: HOSPADM

## 2023-01-01 RX ORDER — CLOTRIMAZOLE 1 %
CREAM (GRAM) TOPICAL DAILY PRN
COMMUNITY

## 2023-01-01 RX ORDER — PIPERACILLIN SODIUM, TAZOBACTAM SODIUM 3; .375 G/15ML; G/15ML
3.38 INJECTION, POWDER, LYOPHILIZED, FOR SOLUTION INTRAVENOUS ONCE
Status: COMPLETED | OUTPATIENT
Start: 2023-01-01 | End: 2023-01-01

## 2023-01-01 RX ORDER — METRONIDAZOLE 500 MG/1
500 TABLET ORAL 3 TIMES DAILY
Status: DISCONTINUED | OUTPATIENT
Start: 2023-01-01 | End: 2023-01-01

## 2023-01-01 RX ORDER — MINERAL OIL/HYDROPHIL PETROLAT
OINTMENT (GRAM) TOPICAL DAILY
COMMUNITY

## 2023-01-01 RX ORDER — PIPERACILLIN SODIUM, TAZOBACTAM SODIUM 3; .375 G/15ML; G/15ML
3.38 INJECTION, POWDER, LYOPHILIZED, FOR SOLUTION INTRAVENOUS EVERY 8 HOURS
Status: DISCONTINUED | OUTPATIENT
Start: 2023-01-01 | End: 2023-01-01 | Stop reason: ALTCHOICE

## 2023-01-01 RX ORDER — MULTIPLE VITAMINS W/ MINERALS TAB 9MG-400MCG
1 TAB ORAL DAILY
COMMUNITY

## 2023-01-01 RX ORDER — LATANOPROST 50 UG/ML
1 SOLUTION/ DROPS OPHTHALMIC AT BEDTIME
COMMUNITY

## 2023-01-01 RX ORDER — IOPAMIDOL 755 MG/ML
80 INJECTION, SOLUTION INTRAVASCULAR ONCE
Status: COMPLETED | OUTPATIENT
Start: 2023-01-01 | End: 2023-01-01

## 2023-01-01 RX ORDER — BACLOFEN 10 MG/1
10 TABLET ORAL
Status: COMPLETED | OUTPATIENT
Start: 2023-01-01 | End: 2023-01-01

## 2023-01-01 RX ORDER — ESCITALOPRAM OXALATE 10 MG/1
10 TABLET ORAL DAILY
COMMUNITY

## 2023-01-01 RX ORDER — SENNOSIDES 8.6 MG
2 TABLET ORAL 2 TIMES DAILY
Status: DISCONTINUED | OUTPATIENT
Start: 2023-01-01 | End: 2023-01-01 | Stop reason: HOSPADM

## 2023-01-01 RX ORDER — LATANOPROST 50 UG/ML
1 SOLUTION/ DROPS OPHTHALMIC AT BEDTIME
Status: DISCONTINUED | OUTPATIENT
Start: 2023-01-01 | End: 2023-01-01 | Stop reason: HOSPADM

## 2023-01-01 RX ORDER — PRAVASTATIN SODIUM 20 MG
40 TABLET ORAL AT BEDTIME
Status: DISCONTINUED | OUTPATIENT
Start: 2023-01-01 | End: 2023-01-01 | Stop reason: HOSPADM

## 2023-01-01 RX ORDER — PROCHLORPERAZINE 25 MG
12.5 SUPPOSITORY, RECTAL RECTAL EVERY 12 HOURS PRN
Status: DISCONTINUED | OUTPATIENT
Start: 2023-01-01 | End: 2023-01-01 | Stop reason: HOSPADM

## 2023-01-01 RX ORDER — LIDOCAINE 40 MG/G
CREAM TOPICAL
Status: DISCONTINUED | OUTPATIENT
Start: 2023-01-01 | End: 2023-01-01 | Stop reason: HOSPADM

## 2023-01-01 RX ORDER — ASPIRIN 81 MG/1
81 TABLET, CHEWABLE ORAL DAILY PRN
COMMUNITY

## 2023-01-01 RX ORDER — POLYETHYLENE GLYCOL 3350 17 G/17G
17 POWDER, FOR SOLUTION ORAL DAILY PRN
Status: DISCONTINUED | OUTPATIENT
Start: 2023-01-01 | End: 2023-01-01 | Stop reason: HOSPADM

## 2023-01-01 RX ORDER — IOPAMIDOL 755 MG/ML
75 INJECTION, SOLUTION INTRAVASCULAR ONCE
Status: COMPLETED | OUTPATIENT
Start: 2023-01-01 | End: 2023-01-01

## 2023-01-01 RX ORDER — POLYETHYLENE GLYCOL 3350 17 G/17G
1 POWDER, FOR SOLUTION ORAL DAILY PRN
COMMUNITY

## 2023-01-01 RX ORDER — ASPIRIN 81 MG/1
81 TABLET, CHEWABLE ORAL DAILY PRN
Status: DISCONTINUED | OUTPATIENT
Start: 2023-01-01 | End: 2023-01-01

## 2023-01-01 RX ORDER — PROCHLORPERAZINE MALEATE 5 MG
5 TABLET ORAL EVERY 6 HOURS PRN
Status: DISCONTINUED | OUTPATIENT
Start: 2023-01-01 | End: 2023-01-01 | Stop reason: HOSPADM

## 2023-01-01 RX ORDER — LISINOPRIL 5 MG/1
5 TABLET ORAL DAILY
COMMUNITY

## 2023-01-01 RX ORDER — CLOTRIMAZOLE 1 %
CREAM (GRAM) TOPICAL DAILY PRN
Status: DISCONTINUED | OUTPATIENT
Start: 2023-01-01 | End: 2023-01-01 | Stop reason: HOSPADM

## 2023-01-01 RX ORDER — ENOXAPARIN SODIUM 100 MG/ML
40 INJECTION SUBCUTANEOUS EVERY 24 HOURS
Status: DISCONTINUED | OUTPATIENT
Start: 2023-01-01 | End: 2023-01-01

## 2023-01-01 RX ORDER — PANTOPRAZOLE SODIUM 20 MG/1
40 TABLET, DELAYED RELEASE ORAL
Status: DISCONTINUED | OUTPATIENT
Start: 2023-01-01 | End: 2023-01-01

## 2023-01-01 RX ORDER — TAMSULOSIN HYDROCHLORIDE 0.4 MG/1
0.4 CAPSULE ORAL DAILY
Status: DISCONTINUED | OUTPATIENT
Start: 2023-01-01 | End: 2023-01-01 | Stop reason: HOSPADM

## 2023-01-01 RX ORDER — PRAVASTATIN SODIUM 40 MG
40 TABLET ORAL AT BEDTIME
COMMUNITY

## 2023-01-01 RX ORDER — NYSTATIN 100000/ML
500000 SUSPENSION, ORAL (FINAL DOSE FORM) ORAL 4 TIMES DAILY
Qty: 60 ML | Refills: 0 | Status: SHIPPED | OUTPATIENT
Start: 2023-01-01 | End: 2023-01-01

## 2023-01-01 RX ORDER — ALLOPURINOL 100 MG/1
100 TABLET ORAL DAILY
COMMUNITY

## 2023-01-01 RX ORDER — BISACODYL 10 MG
10 SUPPOSITORY, RECTAL RECTAL DAILY PRN
Status: DISCONTINUED | OUTPATIENT
Start: 2023-01-01 | End: 2023-01-01 | Stop reason: HOSPADM

## 2023-01-01 RX ORDER — NYSTATIN 100000 [USP'U]/G
POWDER TOPICAL DAILY PRN
COMMUNITY

## 2023-01-01 RX ORDER — MINERAL OIL/HYDROPHIL PETROLAT
OINTMENT (GRAM) TOPICAL DAILY
Status: DISCONTINUED | OUTPATIENT
Start: 2023-01-01 | End: 2023-01-01 | Stop reason: HOSPADM

## 2023-01-01 RX ORDER — ONDANSETRON 4 MG/1
4 TABLET, ORALLY DISINTEGRATING ORAL EVERY 4 HOURS PRN
COMMUNITY

## 2023-01-01 RX ORDER — ALLOPURINOL 100 MG/1
100 TABLET ORAL DAILY
Status: DISCONTINUED | OUTPATIENT
Start: 2023-01-01 | End: 2023-01-01 | Stop reason: HOSPADM

## 2023-01-01 RX ORDER — SENNOSIDES 8.6 MG
2 TABLET ORAL 2 TIMES DAILY
COMMUNITY

## 2023-01-01 RX ORDER — PANTOPRAZOLE SODIUM 20 MG/1
40 TABLET, DELAYED RELEASE ORAL
Status: DISCONTINUED | OUTPATIENT
Start: 2023-01-01 | End: 2023-01-01 | Stop reason: HOSPADM

## 2023-01-01 RX ADMIN — ANORECTAL OINTMENT: 15.7; .44; 24; 20.6 OINTMENT TOPICAL at 20:21

## 2023-01-01 RX ADMIN — APIXABAN 2.5 MG: 2.5 TABLET, FILM COATED ORAL at 10:03

## 2023-01-01 RX ADMIN — PRAVASTATIN SODIUM 40 MG: 20 TABLET ORAL at 20:06

## 2023-01-01 RX ADMIN — ENOXAPARIN SODIUM 40 MG: 100 INJECTION SUBCUTANEOUS at 22:07

## 2023-01-01 RX ADMIN — ALLOPURINOL 100 MG: 100 TABLET ORAL at 17:08

## 2023-01-01 RX ADMIN — ALLOPURINOL 100 MG: 100 TABLET ORAL at 10:02

## 2023-01-01 RX ADMIN — WHITE PETROLATUM: 1.75 OINTMENT TOPICAL at 14:33

## 2023-01-01 RX ADMIN — NYSTATIN 500000 UNITS: 500000 SUSPENSION ORAL at 14:54

## 2023-01-01 RX ADMIN — SODIUM CHLORIDE: 9 INJECTION, SOLUTION INTRAVENOUS at 10:18

## 2023-01-01 RX ADMIN — AMOXICILLIN AND CLAVULANATE POTASSIUM 1 TABLET: 875; 125 TABLET, FILM COATED ORAL at 09:55

## 2023-01-01 RX ADMIN — ANORECTAL OINTMENT: 15.7; .44; 24; 20.6 OINTMENT TOPICAL at 21:19

## 2023-01-01 RX ADMIN — ESCITALOPRAM OXALATE 10 MG: 10 TABLET ORAL at 17:08

## 2023-01-01 RX ADMIN — ESCITALOPRAM OXALATE 10 MG: 10 TABLET ORAL at 08:08

## 2023-01-01 RX ADMIN — ASPIRIN 81 MG 81 MG: 81 TABLET ORAL at 10:04

## 2023-01-01 RX ADMIN — PIPERACILLIN AND TAZOBACTAM 3.38 G: 3; .375 INJECTION, POWDER, FOR SOLUTION INTRAVENOUS at 06:13

## 2023-01-01 RX ADMIN — SODIUM CHLORIDE: 9 INJECTION, SOLUTION INTRAVENOUS at 18:10

## 2023-01-01 RX ADMIN — ASPIRIN 81 MG 81 MG: 81 TABLET ORAL at 17:08

## 2023-01-01 RX ADMIN — SENNOSIDES 2 TABLET: 8.6 TABLET, FILM COATED ORAL at 08:08

## 2023-01-01 RX ADMIN — LATANOPROST 1 DROP: 50 SOLUTION OPHTHALMIC at 20:21

## 2023-01-01 RX ADMIN — WHITE PETROLATUM: 1.75 OINTMENT TOPICAL at 10:07

## 2023-01-01 RX ADMIN — ASPIRIN 81 MG 81 MG: 81 TABLET ORAL at 09:55

## 2023-01-01 RX ADMIN — SODIUM CHLORIDE: 9 INJECTION, SOLUTION INTRAVENOUS at 22:53

## 2023-01-01 RX ADMIN — PRAVASTATIN SODIUM 40 MG: 20 TABLET ORAL at 20:19

## 2023-01-01 RX ADMIN — POLYETHYLENE GLYCOL 3350 17 G: 17 POWDER, FOR SOLUTION ORAL at 14:12

## 2023-01-01 RX ADMIN — MIDAZOLAM HYDROCHLORIDE 1 MG: 1 INJECTION, SOLUTION INTRAMUSCULAR; INTRAVENOUS at 18:16

## 2023-01-01 RX ADMIN — PIPERACILLIN AND TAZOBACTAM 3.38 G: 3; .375 INJECTION, POWDER, FOR SOLUTION INTRAVENOUS at 22:52

## 2023-01-01 RX ADMIN — APIXABAN 2.5 MG: 2.5 TABLET, FILM COATED ORAL at 09:55

## 2023-01-01 RX ADMIN — LISINOPRIL 5 MG: 5 TABLET ORAL at 17:08

## 2023-01-01 RX ADMIN — PIPERACILLIN AND TAZOBACTAM 3.38 G: 3; .375 INJECTION, POWDER, FOR SOLUTION INTRAVENOUS at 15:49

## 2023-01-01 RX ADMIN — ESCITALOPRAM OXALATE 10 MG: 10 TABLET ORAL at 10:05

## 2023-01-01 RX ADMIN — ANORECTAL OINTMENT: 15.7; .44; 24; 20.6 OINTMENT TOPICAL at 10:20

## 2023-01-01 RX ADMIN — ANORECTAL OINTMENT: 15.7; .44; 24; 20.6 OINTMENT TOPICAL at 20:10

## 2023-01-01 RX ADMIN — PANTOPRAZOLE SODIUM 40 MG: 20 TABLET, DELAYED RELEASE ORAL at 09:54

## 2023-01-01 RX ADMIN — WHITE PETROLATUM: 1.75 OINTMENT TOPICAL at 08:09

## 2023-01-01 RX ADMIN — CEFEPIME 2 G: 2 INJECTION, POWDER, FOR SOLUTION INTRAVENOUS at 18:42

## 2023-01-01 RX ADMIN — BACLOFEN 10 MG: 10 TABLET ORAL at 12:32

## 2023-01-01 RX ADMIN — IOPAMIDOL 75 ML: 755 INJECTION, SOLUTION INTRAVENOUS at 15:05

## 2023-01-01 RX ADMIN — LATANOPROST 1 DROP: 50 SOLUTION OPHTHALMIC at 22:26

## 2023-01-01 RX ADMIN — ASPIRIN 81 MG 81 MG: 81 TABLET ORAL at 08:08

## 2023-01-01 RX ADMIN — LISINOPRIL 5 MG: 5 TABLET ORAL at 09:55

## 2023-01-01 RX ADMIN — SENNOSIDES 2 TABLET: 8.6 TABLET, FILM COATED ORAL at 10:06

## 2023-01-01 RX ADMIN — NYSTATIN 500000 UNITS: 500000 SUSPENSION ORAL at 20:19

## 2023-01-01 RX ADMIN — APIXABAN 2.5 MG: 2.5 TABLET, FILM COATED ORAL at 20:19

## 2023-01-01 RX ADMIN — NYSTATIN 500000 UNITS: 500000 SUSPENSION ORAL at 17:29

## 2023-01-01 RX ADMIN — PIPERACILLIN AND TAZOBACTAM 3.38 G: 3; .375 INJECTION, POWDER, FOR SOLUTION INTRAVENOUS at 23:13

## 2023-01-01 RX ADMIN — WHITE PETROLATUM: 1.75 OINTMENT TOPICAL at 10:22

## 2023-01-01 RX ADMIN — ANORECTAL OINTMENT: 15.7; .44; 24; 20.6 OINTMENT TOPICAL at 22:31

## 2023-01-01 RX ADMIN — TAMSULOSIN HYDROCHLORIDE 0.4 MG: 0.4 CAPSULE ORAL at 08:08

## 2023-01-01 RX ADMIN — MICONAZOLE NITRATE: 2 POWDER TOPICAL at 02:43

## 2023-01-01 RX ADMIN — LISINOPRIL 5 MG: 5 TABLET ORAL at 10:05

## 2023-01-01 RX ADMIN — LATANOPROST 1 DROP: 50 SOLUTION OPHTHALMIC at 21:17

## 2023-01-01 RX ADMIN — SODIUM CHLORIDE 2000 ML: 9 INJECTION, SOLUTION INTRAVENOUS at 13:41

## 2023-01-01 RX ADMIN — PANTOPRAZOLE SODIUM 40 MG: 20 TABLET, DELAYED RELEASE ORAL at 06:45

## 2023-01-01 RX ADMIN — TAMSULOSIN HYDROCHLORIDE 0.4 MG: 0.4 CAPSULE ORAL at 10:06

## 2023-01-01 RX ADMIN — PIPERACILLIN AND TAZOBACTAM 3.38 G: 3; .375 INJECTION, POWDER, FOR SOLUTION INTRAVENOUS at 06:08

## 2023-01-01 RX ADMIN — LISINOPRIL 5 MG: 5 TABLET ORAL at 08:09

## 2023-01-01 RX ADMIN — LATANOPROST 1 DROP: 50 SOLUTION OPHTHALMIC at 20:06

## 2023-01-01 RX ADMIN — SODIUM CHLORIDE: 9 INJECTION, SOLUTION INTRAVENOUS at 10:13

## 2023-01-01 RX ADMIN — AMOXICILLIN AND CLAVULANATE POTASSIUM 1 TABLET: 875; 125 TABLET, FILM COATED ORAL at 21:10

## 2023-01-01 RX ADMIN — SODIUM CHLORIDE: 9 INJECTION, SOLUTION INTRAVENOUS at 01:56

## 2023-01-01 RX ADMIN — IOPAMIDOL 80 ML: 755 INJECTION, SOLUTION INTRAVENOUS at 16:23

## 2023-01-01 RX ADMIN — SODIUM CHLORIDE: 9 INJECTION, SOLUTION INTRAVENOUS at 06:20

## 2023-01-01 RX ADMIN — TAMSULOSIN HYDROCHLORIDE 0.4 MG: 0.4 CAPSULE ORAL at 09:54

## 2023-01-01 RX ADMIN — APIXABAN 2.5 MG: 2.5 TABLET, FILM COATED ORAL at 21:10

## 2023-01-01 RX ADMIN — SODIUM CHLORIDE: 9 INJECTION, SOLUTION INTRAVENOUS at 02:22

## 2023-01-01 RX ADMIN — MICONAZOLE NITRATE: 2 POWDER TOPICAL at 08:09

## 2023-01-01 RX ADMIN — NYSTATIN 500000 UNITS: 500000 SUSPENSION ORAL at 08:08

## 2023-01-01 RX ADMIN — SODIUM CHLORIDE: 9 INJECTION, SOLUTION INTRAVENOUS at 18:15

## 2023-01-01 RX ADMIN — PANTOPRAZOLE SODIUM 40 MG: 40 INJECTION, POWDER, FOR SOLUTION INTRAVENOUS at 09:13

## 2023-01-01 RX ADMIN — VANCOMYCIN HYDROCHLORIDE 2000 MG: 5 INJECTION, POWDER, LYOPHILIZED, FOR SOLUTION INTRAVENOUS at 19:12

## 2023-01-01 RX ADMIN — TAMSULOSIN HYDROCHLORIDE 0.4 MG: 0.4 CAPSULE ORAL at 17:11

## 2023-01-01 RX ADMIN — PIPERACILLIN AND TAZOBACTAM 3.38 G: 3; .375 INJECTION, POWDER, FOR SOLUTION INTRAVENOUS at 16:01

## 2023-01-01 RX ADMIN — SODIUM CHLORIDE: 9 INJECTION, SOLUTION INTRAVENOUS at 15:07

## 2023-01-01 RX ADMIN — VANCOMYCIN HYDROCHLORIDE 1250 MG: 5 INJECTION, POWDER, LYOPHILIZED, FOR SOLUTION INTRAVENOUS at 19:56

## 2023-01-01 RX ADMIN — SENNOSIDES 2 TABLET: 8.6 TABLET, FILM COATED ORAL at 20:06

## 2023-01-01 RX ADMIN — SODIUM CHLORIDE: 9 INJECTION, SOLUTION INTRAVENOUS at 10:24

## 2023-01-01 RX ADMIN — ALLOPURINOL 100 MG: 100 TABLET ORAL at 09:55

## 2023-01-01 RX ADMIN — ALLOPURINOL 100 MG: 100 TABLET ORAL at 08:08

## 2023-01-01 RX ADMIN — AMOXICILLIN AND CLAVULANATE POTASSIUM 1 TABLET: 875; 125 TABLET, FILM COATED ORAL at 20:19

## 2023-01-01 RX ADMIN — PRAVASTATIN SODIUM 40 MG: 20 TABLET ORAL at 21:10

## 2023-01-01 RX ADMIN — ANORECTAL OINTMENT: 15.7; .44; 24; 20.6 OINTMENT TOPICAL at 10:05

## 2023-01-01 RX ADMIN — APIXABAN 2.5 MG: 2.5 TABLET, FILM COATED ORAL at 08:08

## 2023-01-01 RX ADMIN — ESCITALOPRAM OXALATE 10 MG: 10 TABLET ORAL at 09:55

## 2023-01-01 RX ADMIN — PANTOPRAZOLE SODIUM 40 MG: 20 TABLET, DELAYED RELEASE ORAL at 06:30

## 2023-01-01 RX ADMIN — ANORECTAL OINTMENT: 15.7; .44; 24; 20.6 OINTMENT TOPICAL at 08:09

## 2023-01-01 RX ADMIN — WHITE PETROLATUM: 1.75 OINTMENT TOPICAL at 22:27

## 2023-01-01 RX ADMIN — APIXABAN 2.5 MG: 2.5 TABLET, FILM COATED ORAL at 20:06

## 2023-01-01 RX ADMIN — AMOXICILLIN AND CLAVULANATE POTASSIUM 1 TABLET: 875; 125 TABLET, FILM COATED ORAL at 08:08

## 2023-01-01 RX ADMIN — SODIUM CHLORIDE: 9 INJECTION, SOLUTION INTRAVENOUS at 22:08

## 2023-01-01 ASSESSMENT — ACTIVITIES OF DAILY LIVING (ADL)
ADLS_ACUITY_SCORE: 35
ADLS_ACUITY_SCORE: 55
ADLS_ACUITY_SCORE: 57
ADLS_ACUITY_SCORE: 55
ADLS_ACUITY_SCORE: 57
ADLS_ACUITY_SCORE: 59
ADLS_ACUITY_SCORE: 57
ADLS_ACUITY_SCORE: 35
ADLS_ACUITY_SCORE: 57
ADLS_ACUITY_SCORE: 35
ADLS_ACUITY_SCORE: 57
ADLS_ACUITY_SCORE: 57
ADLS_ACUITY_SCORE: 52
ADLS_ACUITY_SCORE: 35
ADLS_ACUITY_SCORE: 45
ADLS_ACUITY_SCORE: 45
ADLS_ACUITY_SCORE: 61
ADLS_ACUITY_SCORE: 57
ADLS_ACUITY_SCORE: 57
ADLS_ACUITY_SCORE: 59
ADLS_ACUITY_SCORE: 61
ADLS_ACUITY_SCORE: 35
ADLS_ACUITY_SCORE: 41
ADLS_ACUITY_SCORE: 59
ADLS_ACUITY_SCORE: 35
ADLS_ACUITY_SCORE: 57
ADLS_ACUITY_SCORE: 59
ADLS_ACUITY_SCORE: 61
ADLS_ACUITY_SCORE: 57
ADLS_ACUITY_SCORE: 61
ADLS_ACUITY_SCORE: 57
ADLS_ACUITY_SCORE: 53
ADLS_ACUITY_SCORE: 57
ADLS_ACUITY_SCORE: 57
ADLS_ACUITY_SCORE: 45
ADLS_ACUITY_SCORE: 57
ADLS_ACUITY_SCORE: 35
ADLS_ACUITY_SCORE: 57
ADLS_ACUITY_SCORE: 57
ADLS_ACUITY_SCORE: 55
ADLS_ACUITY_SCORE: 57
DEPENDENT_IADLS:: CLEANING;COOKING;LAUNDRY;SHOPPING;MEAL PREPARATION;MEDICATION MANAGEMENT;MONEY MANAGEMENT;TRANSPORTATION;INCONTINENCE

## 2023-02-04 PROBLEM — K21.9 GERD (GASTROESOPHAGEAL REFLUX DISEASE): Status: ACTIVE | Noted: 2020-09-18

## 2023-02-04 PROBLEM — G93.40 ACUTE ENCEPHALOPATHY: Status: ACTIVE | Noted: 2023-01-01

## 2023-02-04 PROBLEM — E87.20 LACTIC ACIDOSIS: Status: ACTIVE | Noted: 2023-01-01

## 2023-02-04 PROBLEM — A41.9 SEPSIS, DUE TO UNSPECIFIED ORGANISM, UNSPECIFIED WHETHER ACUTE ORGAN DYSFUNCTION PRESENT (H): Status: ACTIVE | Noted: 2023-01-01

## 2023-02-04 PROBLEM — S06.5XAA SUBDURAL HEMATOMA (H): Status: ACTIVE | Noted: 2019-06-24

## 2023-02-04 PROBLEM — S62.525A NONDISPLACED FRACTURE OF DISTAL PHALANX OF LEFT THUMB, INITIAL ENCOUNTER FOR CLOSED FRACTURE: Status: ACTIVE | Noted: 2019-05-27

## 2023-02-04 PROBLEM — S43.109A AC SEPARATION: Status: ACTIVE | Noted: 2019-06-24

## 2023-02-04 PROBLEM — S12.500A C6 CERVICAL FRACTURE (H): Status: ACTIVE | Noted: 2019-06-24

## 2023-02-04 PROBLEM — S02.0XXA: Status: ACTIVE | Noted: 2019-06-23

## 2023-02-04 PROBLEM — I69.30 HISTORY OF CVA WITH RESIDUAL DEFICIT: Status: ACTIVE | Noted: 2020-09-18

## 2023-02-04 PROBLEM — I63.9 CVA (CEREBRAL VASCULAR ACCIDENT) (H): Status: ACTIVE | Noted: 2019-05-27

## 2023-02-04 PROBLEM — L98.499: Status: ACTIVE | Noted: 2019-07-26

## 2023-02-04 PROBLEM — K22.2 SCHATZKI'S RING: Status: ACTIVE | Noted: 2019-03-17

## 2023-02-04 PROBLEM — R33.9 RETENTION OF URINE: Status: ACTIVE | Noted: 2019-05-30

## 2023-02-04 PROBLEM — S52.91XA FRACTURE OF RIGHT RADIUS: Status: ACTIVE | Noted: 2019-05-27

## 2023-02-04 PROBLEM — S42.001A FRACTURE OF RIGHT CLAVICLE: Status: ACTIVE | Noted: 2019-05-27

## 2023-02-04 PROBLEM — S12.100A CLOSED FRACTURE OF SECOND CERVICAL VERTEBRA (H): Status: ACTIVE | Noted: 2019-05-27

## 2023-02-04 PROBLEM — K40.30 INCARCERATED INGUINAL HERNIA: Status: ACTIVE | Noted: 2020-09-18

## 2023-02-04 PROBLEM — K52.9 COLITIS: Status: ACTIVE | Noted: 2020-09-18

## 2023-02-04 PROBLEM — S22.32XA LEFT RIB FRACTURE: Status: ACTIVE | Noted: 2019-06-24

## 2023-02-04 PROBLEM — I82.4Y9 DEEP VEIN THROMBOSIS (DVT) OF PROXIMAL LOWER EXTREMITY, UNSPECIFIED CHRONICITY, UNSPECIFIED LATERALITY (H): Status: ACTIVE | Noted: 2019-10-22

## 2023-02-04 PROBLEM — K86.9 PANCREATIC LESION: Status: ACTIVE | Noted: 2020-09-19

## 2023-02-04 PROBLEM — K43.9 SPIGELIAN HERNIA: Status: ACTIVE | Noted: 2023-01-01

## 2023-02-04 NOTE — PHARMACY-VANCOMYCIN DOSING SERVICE
"Pharmacy Vancomycin Initial Note  Date of Service 2023  Patient's  1940  82 year old, male    Indication: Sepsis    Current estimated CrCl = Estimated Creatinine Clearance: 58.3 mL/min (based on SCr of 1.24 mg/dL).    Creatinine for last 3 days  2023:  2:20 PM Creatinine 1.24 mg/dL    Recent Vancomycin Level(s) for last 3 days  No results found for requested labs within last 72 hours.      Vancomycin IV Administrations (past 72 hours)      No vancomycin orders with administrations in past 72 hours.                Nephrotoxins and other renal medications (From now, onward)    Start     Dose/Rate Route Frequency Ordered Stop    23 1800  vancomycin (VANCOCIN) 2,000 mg in 0.9% NaCl 500 mL intermittent infusion         2,000 mg  over 2 Hours Intravenous ONCE 23 1756      23 1500  piperacillin-tazobactam (ZOSYN) 3.375 g vial to attach to  mL bag        Note to Pharmacy: For [facility], [facility] and [facility]: For Zosyn-naive patients, use the \"Zosyn initial dose + extended infusion\" order panel.    3.375 g  over 240 Minutes Intravenous ONCE 23 1459            Contrast Orders - past 72 hours (72h ago, onward)    Start     Dose/Rate Route Frequency Stop    23 1600  iopamidol (ISOVUE-370) solution 80 mL         80 mL Intravenous ONCE 23 1623              Plan:  1. Start vancomycin  2000 mg IV once.   2. Please consult Pharmacy for further dosing.     Jacques Mayfield [facility]    "

## 2023-02-04 NOTE — CONSULTS
Care Management Initial Consult    General Information  Assessment completed with: Other, friend/POA/HCA Steven via phone [phone]  Type of CM/SW Visit: Initial Assessment    Primary Care Provider verified and updated as needed: Yes   Readmission within the last 30 days: no previous admission in last 30 days         Advance Care Planning: Advance Care Planning Reviewed:  (per [facility] staff, friend Steven Jackson is POA and HCA. CM emailed [employer] requesting ACP review to validate if any HCD on file within the system. CM requested for [facility] to fax POLST and HCD.)        Communication Assessment  Patient's communication style: spoken language (English or Bilingual)        Cognitive  Cognitive/Neuro/Behavioral: not assessed by CM. Hx of dementia.     Living Environment:   People in home: facility resident     Current living Arrangements: assisted living  Name of Facility: [facility]   Able to return to prior arrangements: yes     Family/Social Support:  Care provided by: other (see comments) ([facility] staff assist with most cares)  Provides care for: no one  Marital Status: Single   (never , no children, has a sister, friend Steven)          Description of Support System:         Current Resources:   Patient receiving home care services: No  Community Resources:  (Assisted Living)  Equipment currently used at home:  (ceiling lift for transfers, specialized WC)  Supplies currently used at home: [employer]    Employment/Financial:  Employment Status: retired     Financial Concerns: None identified at this time.     Lifestyle & Psychosocial Needs:  Social Determinants of Health     Tobacco Use: Medium Risk     Smoking Tobacco Use: Former     Smokeless Tobacco Use: Never     Passive Exposure: Not on file   Alcohol Use: Not on file   Financial Resource Strain: Not on file   Food Insecurity: Not on file   Transportation Needs: Not on file   Physical Activity: Not on file  "  Stress: Not on file   Social Connections: Not on file   Intimate Partner Violence: Not on file   Depression: Not on file   Housing Stability: Not on file     Functional Status:  Prior to admission patient needed assistance:   Dependent ADLs:: Bathing, Dressing, Eating, Grooming, Incontinence, Toileting ([facility] staff assist with most cares, does not ambulate, WC or bed bound)  Dependent IADLs:: Cleaning, Cooking, Laundry, Shopping, Meal Preparation, Medication Management, Money Management, Transportation, Incontinence ([facility] staff assist with cares, friend Steven is POA/HCA and manages finances and decision making)  Assesssment of Functional Status: Not at  functional baseline (report of recent \"rapid decline\")    Mental Health Status:  Mental Health Status: No Current Concerns (hx of depression and dementia)       Chemical Dependency Status:  Chemical Dependency Status: No Current Concerns             Values/Beliefs:  Spiritual, Cultural Beliefs, Muslim Practices, Values that affect care:  Not assessed.                Additional Information:  Chart reviewed.     CM spoke to [facility] ([facility] in [place]. House phone [phone], Nurse Line [phone] and fax [phone]). They use [facility]. They can accept him back at anytime (with notice) and are capable of caring for patient on hospice services. Staff report patient's decision maker/POA/HCA is friend Steven [phone] Staff report facility and friend have had recent discussions about possible transition to hospice services due to \"rapid decline\".. Staff report patient has only been resident at their facility for ~ 3 months. NIK requested for staff to fax CM any documentation available confirming POA/HCA.     [facility] staff assist with all cares. They use ceiling lift for transfers. He is bed vs WC bound (has specialized geriatric WC for safety).     CM updated MD. CM spoke to friend Steven via phone (he confirms " "[facility] informed patient was at [facility] and at this time continue with medical work up, including lumbar puncture if indicated by MD). MD also spoke to friend Steven via phone.     8:20 PM  CM received HCD from [facility]; CM emailed to [employer] requesting review to validate. Paper copy also sent up to  for paper chart.     Leesa holder ([phone]) listed on HCD as a HCA and on [facility] paperwork as an emergency contact. Per friend Steven, patients sister is \"80 and I have been keeping her and a niece in the loop\".     CM continue to follow and assist with discharge planning.   Dannielle Foster RN          "

## 2023-02-04 NOTE — ED NOTES
This writer spoke with nurse, Jeane, in regards to patient's plan of care. Jeane is going to send paperwork over via fax. All questions answered.     Jeane [phone]

## 2023-02-04 NOTE — ED TRIAGE NOTES
Patient arrives via EMS for lethargy. BP's 80/50's. 's. . Increased pain to upper extremities with movement.     Baseline patent is able to say yes or no.   Missing toenail on right toe.   Scratches noted on left thigh.      Triage Assessment     Row Name 02/04/23 1322       Triage Assessment (Adult)    Airway WDL WDL       Respiratory WDL    Respiratory WDL WDL       Skin Circulation/Temperature WDL    Skin Circulation/Temperature WDL X       Cardiac WDL    Cardiac WDL rhythm    Pulse Rate & Regularity tachycardic       Cognitive/Neuro/Behavioral WDL    Cognitive/Neuro/Behavioral WDL X    Level of Consciousness lethargic    Arousal Level arouses to voice;arouses to touch/gentle shaking    Speech garbled

## 2023-02-04 NOTE — ED NOTES
Bed: [facility]-15  Expected date:   Expected time:   Means of arrival: Ambulance  Comments:  82 yo AMS hypotension     Rehab

## 2023-02-04 NOTE — ED PROVIDER NOTES
EMERGENCY DEPARTMENT ENCOUNTER      NAME: Charles Mckeon  AGE: 82 year old male  YOB: 1940  MRN: [id number]  EVALUATION DATE & TIME: 2023  1:17 PM    PCP: No Ref-Primary, Physician    ED PROVIDER: Chintan Ochoa M.D.      Chief Complaint   Patient presents with     Altered Mental Status     Hypotension         FINAL IMPRESSION:  1. Sepsis, due to unspecified organism, unspecified whether acute organ dysfunction present (H)    2. Lactic acidosis    3. Acute encephalopathy          ED COURSE & MEDICAL DECISION MAKIN year old male presents to the Emergency Department for evaluation of altered mental status and low blood pressure.  He arrives to the emergency department tachycardic, was hypotensive in the field but improved after little bit of IV fluid administered by EMS.  He has a history of advancing Alzheimer's dementia, is essentially nonverbal at baseline but able to sometimes respond with yes no answers.  He seems a little worse than his baseline today per staff.  They also note that he seems more stiff and less responsive.  He said he has not had anything to eat today and has had decreased appetite for the last couple of days.  He was mildly hypothermic to 94.9 by rectal temperature here.  He underwent a broad lab evaluation and imaging for concern for sepsis or other systemic cause of hypotension.  His white blood cell count is elevated at 22, lactic acid initially elevated at 2.4 prompting concern for sepsis, he was given 2 L IV fluid bolus with improvement in his heart rate.  Rhythm appears to be sinus with frequent PACs on the monitor.  2 blood cultures also obtained prior to initiation of broad-spectrum antibiotics for undifferentiated sepsis with Zosyn.  Initial labs so far been unremarkable including negative urine analysis by cath specimen, negative COVID and influenza screening, negative chest x-ray.  He seems to have some tightness and stiffness in his neck and shoulders,  some of this may be related to his previous stroke.  He underwent additional CT imaging since his initial lab evaluation was unrevealing. Head CT negative for acute intracranial process. CT neck soft tissue neck CT and chest abdomen pelvis CT imaging with contrast.  This was negative for any other acute inflammatory process to account for the patient's symptoms.    I long discussion with the patient's friend and surrogate healthcare decision-maker Fouzia.  Fouzia reiterated that the patient would generally want a less invasive focus to his treatment, probably would be amenable to things like IV antibiotics, IV fluids, and some diagnostics.  To that end we discussed the possibility of lumbar puncture which Pat did consent for.  Unfortunately despite a couple of attempts here, this was unsuccessful, patient positioning was challenging due to discomfort and spacticity despite some Versed for anxiolysis..  I did briefly discuss with Dr. Hughes with neuro IR who said they could reattempt under fluoroscopy if desired tomorrow.    On recheck the patient's lactic acid did normalize after 2 L IV fluid bolus.  I am going to broaden out his antibiotics for CSF coverage at this point to include vancomycin and cefepime.  I discussed the case with the resident service for admission.        MEDICATIONS GIVEN IN THE EMERGENCY:  Medications   piperacillin-tazobactam (ZOSYN) 3.375 g vial to attach to  mL bag (3.375 g Intravenous Given 2/4/23 1601)   vancomycin (VANCOCIN) 2,000 mg in 0.9% NaCl 500 mL intermittent infusion (has no administration in time range)   ceFEPIme (MAXIPIME) 2 g vial to attach to  ml bag for ADULTS or 50 ml bag for PEDS (has no administration in time range)   0.9% sodium chloride BOLUS (0 mLs Intravenous Stopped 2/4/23 1510)   iopamidol (ISOVUE-370) solution 80 mL (80 mLs Intravenous Given 2/4/23 1623)   midazolam (VERSED) injection 2 mg (1 mg Intravenous Given 2/4/23 1816)       NEW PRESCRIPTIONS STARTED  AT TODAY'S ER VISIT  New Prescriptions    No medications on file          =================================================================    HPI    Patient information was obtained from: Patient, Care Facility      Charles Mckeon is a 82 year old male with a pertinent history of Alzheimer's disease, CVA with right upper extremity weakness, hypertension, who presents from his facility with altered mental status and hypotension.  Facility staff report that he has been with them for a few months, during the last month has had rather pronounced cognitive decline.  They report for the last 3 days he has been somewhat more agitated, not eating at all, today he noticed he was sweaty, seemed stiff, and was not eating at all.  They noticed blood pressures were as low as 80s at home.  he is nonverbal, they say he can usually indicate yes and no in response to simple questions.  He can sometimes wheel himself around in a chair but is nonambulatory.  They have not noticed any other associated symptoms on review      ROS  Unable to obtain reliable ROS due to dementia and encephalopathy      PAST MEDICAL HISTORY:  No past medical history on file.    PAST SURGICAL HISTORY:  No past surgical history on file.        CURRENT MEDICATIONS:    Current Facility-Administered Medications   Medication     ceFEPIme (MAXIPIME) 2 g vial to attach to  ml bag for ADULTS or 50 ml bag for PEDS     piperacillin-tazobactam (ZOSYN) 3.375 g vial to attach to  mL bag     vancomycin (VANCOCIN) 2,000 mg in 0.9% NaCl 500 mL intermittent infusion     Current Outpatient Medications   Medication     acetaminophen (TYLENOL) 500 MG tablet     allopurinol (ZYLOPRIM) 100 MG tablet     apixaban ANTICOAGULANT (ELIQUIS) 2.5 MG tablet     aspirin (ASA) 81 MG chewable tablet     bisacodyl (DULCOLAX) 10 MG suppository     clotrimazole (LOTRIMIN) 1 % external cream     escitalopram (LEXAPRO) 10 MG tablet     latanoprost (XALATAN) 0.005 % ophthalmic  "solution     lisinopril (ZESTRIL) 5 MG tablet     menthol-zinc oxide (CALMOSEPTINE) 0.44-20.6 % OINT ointment     mineral oil-hydrophilic petrolatum (AQUAPHOR) external ointment     multivitamin w/minerals (THERA-VIT-M) tablet     nystatin (MYCOSTATIN) 391609 UNIT/GM external powder     omeprazole (PRILOSEC) 20 MG DR capsule     ondansetron (ZOFRAN ODT) 4 MG ODT tab     polyethylene glycol (MIRALAX) 17 g packet     pravastatin (PRAVACHOL) 40 MG tablet     sennosides (SENOKOT) 8.6 MG tablet     tamsulosin (FLOMAX) 0.4 MG capsule         ALLERGIES:  Allergies   Allergen Reactions     Naproxen Unknown     Other reaction(s): Bleeding       FAMILY HISTORY:  No family history on file.    SOCIAL HISTORY:   Social History     Socioeconomic History     Marital status: Single       VITALS:  /64   Pulse 104   Temp 99.6  F (37.6  C) (Oral)   Resp 20   Ht 1.905 m (6' 3\")   Wt 93 kg (205 lb)   SpO2 97%   BMI 25.62 kg/m      PHYSICAL EXAM    Constitutional: Chronically ill-appearing elderly male patient, lethargic but awakens to voice, groaning in apparent discomfort.  HENT: Normocephalic and atraumatic.  Neck is stiff with decreased passive range of motion.  No visible external deformities.  Mouth is dry.  Eyes: EOMI, Conjunctiva normal.  Respiratory: Breathing comfortably on room air. Lungs clear to ascultation.  Cardiovascular: Tachycardic heart rate, Regular rhythm. No peripheral edema.  Abdomen: Soft, nontender  Musculoskeletal: Decreased range of motion at the bilateral upper extremities, all extremities are somewhat stiff, particularly the right upper extremity.  No palpable effusions or asymmetric warmth or erythema  Integument: Warm, Dry.  Neurologic: Somewhat lethargic but awakens to voice.  Face is symmetric.  Moving all extremities to some degree purposefully and withdraws to pain x4.  Unable to reliably follow commands.  Psychiatric: Cooperative. Unable to further assess.     LAB:  All pertinent labs " reviewed and interpreted.  Labs Ordered and Resulted from Time of ED Arrival to Time of ED Departure   BASIC METABOLIC PANEL - Abnormal       Result Value    Sodium 141      Potassium 4.2      Chloride 108 (*)     Carbon Dioxide (CO2) 18 (*)     Anion Gap 15      Urea Nitrogen 17      Creatinine 1.24      Calcium 8.9      Glucose 123      GFR Estimate 57 (*)    HEPATIC FUNCTION PANEL - Abnormal    Bilirubin Total 0.6      Bilirubin Direct 0.2      Protein Total 6.7      Albumin 3.6      Alkaline Phosphatase 64      AST 43 (*)     ALT 22     LACTIC ACID WHOLE BLOOD - Abnormal    Lactic Acid 2.4 (*)    ROUTINE UA WITH MICROSCOPIC REFLEX TO CULTURE - Abnormal    Color Urine Yellow      Appearance Urine Clear      Glucose Urine Negative      Bilirubin Urine Negative      Ketones Urine Trace (*)     Specific Gravity Urine 1.024      Blood Urine 0.2 mg/dL (*)     pH Urine 5.5      Protein Albumin Urine 30 (*)     Urobilinogen Urine <2.0      Nitrite Urine Negative      Leukocyte Esterase Urine Negative      Mucus Urine Present (*)     RBC Urine 2      WBC Urine 2      Squamous Epithelials Urine <1      Hyaline Casts Urine 4 (*)    CBC WITH PLATELETS AND DIFFERENTIAL - Abnormal    WBC Count 22.0 (*)     RBC Count 4.30 (*)     Hemoglobin 13.7      Hematocrit 41.3      MCV 96      MCH 31.9      MCHC 33.2      RDW 14.6      Platelet Count 410      % Neutrophils 90      % Lymphocytes 3      % Monocytes 6      % Eosinophils 0      % Basophils 0      % Immature Granulocytes 1      NRBCs per 100 WBC 0      Absolute Neutrophils 19.6 (*)     Absolute Lymphocytes 0.8      Absolute Monocytes 1.4 (*)     Absolute Eosinophils 0.0      Absolute Basophils 0.1      Absolute Immature Granulocytes 0.2      Absolute NRBCs 0.0     INR - Abnormal    INR 1.29 (*)    LIPASE - Normal    Lipase 45     PROCALCITONIN - Normal    Procalcitonin 0.10     INFLUENZA A/B & SARS-COV2 PCR MULTIPLEX - Normal    Influenza A PCR Negative      Influenza B PCR  Negative      RSV PCR Negative      SARS CoV2 PCR Negative     LACTIC ACID WHOLE BLOOD - Normal    Lactic Acid 1.7     BLOOD CULTURE   BLOOD CULTURE       RADIOLOGY:  Reviewed all pertinent imaging. Please see official radiology report.  Head CT w/o contrast   Final Result   IMPRESSION:   1.  No acute transcortical infarct, intracranial hemorrhage, or mass effect.    2.  Moderate diffuse cerebral volume loss and microangiopathic ischemic white matter changes.    3.  Chronic cerebellar lacunar infarcts.   4.  Trace aerated secretions in right posterior ethmoid air cell can be seen with acute sinusitis in appropriate clinical context.            Soft tissue neck CT w contrast   Final Result   IMPRESSION:   1.  Examination is rather limited due to suboptimal patient positioning and associated metallic streak artifact from dental amalgam.   2.  No obvious masslike enhancement, lymphadenopathy, or faraz edema/fluid collection in the assessable soft tissues.            CT Chest/Abdomen/Pelvis w Contrast   Final Result   IMPRESSION:   1.  No acute findings or other explanation for leukocytosis.      2.  Small fluid collection partially imaged along the lateral aspect of the right gluteus svitlana suggestive of a chronic hematoma. Correlate with history.      3.  Incidental subcentimeter pulmonary nodules. Follow-up recommendations below:      REFERENCE:   Guidelines for Management of Incidental Pulmonary Nodules Detected on CT Images: From the Fleischner Society 2017.    Guidelines apply to incidental nodules in patients who are 35 years or older.   Guidelines do not apply to lung cancer screening, patients with immunosuppression, or patients with known primary cancer.      MULTIPLE NODULES   Nodule size 6 mm or larger   Low-risk patients: Follow-up CT at 3-6 months, then consider CT at 18-24 months.   High-risk patients: Follow-up CT at 3-6 months, then at 18-24 months if no change.      4.  Incidental 1.0 cm unilocular  cystic lesion in the pancreatic neck. Follow-up recommendations below:      REFERENCE:   Revisions of international consensus [place] guidelines for the management of IPMN of the pancreas. Pancreatology 2017;17(5):738-753.      Largest cyst between 10-20 mm: CT or MRI/MRCP every 6 months for 1 year and then yearly for 2 years and then every 2 years if no change.          XR Chest Port 1 View   Final Result   IMPRESSION: Negative chest.          PROCEDURE: Lumbar Puncture   INDICATION: confusion   PROCEDURE PROVIDER: Dr Tye Ochoa   CONSENT: Risks, benefits and alternatives were discussed with and Verbal consent was obtained from Patient's Friend and Healthcare Surrogate Pat.   TIME OUT: Universal protocol was followed. TIME OUT conducted just prior to starting procedure confirmed patient identity, site/side, procedure, patient position, and availability of correct equipment. Yes   NOTE: Patient was placed in a right lateral decubitus position and the iliac crests were palpated. The L4-5 interspace was identified. The area was prepped with chlorhexidine and draped in the usual sterile fashion. 3 mLs of 1% Lidocaine without epinephrine was then injected subcutaneously to provide local anesthesia. Two attempts were made with landmark guidance and the CSF space was unable to be accessed successfully. Patient positioning was difficult due to spacticity and discomfort. Further attempts were felt likely to result in more harm than benefit. Needle removed, some direct pressure was applied and site was bandaged.     COMPLICATIONS: Good tolerance of procedure, but unsuccessful in obtaining CSF specimen, see above.             EKG:    Performed at: 1325    Impression: Undetermined rhythm, possibly sinus with frequent PACs, RBBB    Rate: 116  Rhythm: NA  Axis: Normal  CA Interval: NA  QRS Interval: 128  QTc Interval: 508  ST Changes: None significant  Comparison: When compared to January 8, 2019, there are frequent PACs,  otherwise no significant change    I have independently reviewed and interpreted the EKG(s) documented above.      Chintan Ochoa M.D.  Emergency Medicine  [facility] EMERGENCY ROOM  [facility] [facility] [id number] [phone]  Dept: [phone]       Chintan Ochoa MD  02/04/23 1830       Chintan Ochoa MD  02/04/23 0175

## 2023-02-04 NOTE — PHARMACY-ADMISSION MEDICATION HISTORY
Pharmacy Note - Admission Medication History    Pertinent Provider Information: Pt from [facility] living [facility].      ______________________________________________________________________    Prior To Admission (PTA) med list completed and updated in EMR.       PTA Med List   Medication Sig Last Dose     acetaminophen (TYLENOL) 500 MG tablet Take 1,000 mg by mouth 3 times daily 2/3/2023 at 2000     allopurinol (ZYLOPRIM) 100 MG tablet Take 100 mg by mouth daily 2/3/2023 at 0900     apixaban ANTICOAGULANT (ELIQUIS) 2.5 MG tablet Take 2.5 mg by mouth 2 times daily 2/3/2023 at 2000     aspirin (ASA) 81 MG chewable tablet Take 81 mg by mouth daily as needed (for CAD) Unknown     bisacodyl (DULCOLAX) 10 MG suppository Place 10 mg rectally daily as needed for constipation Unknown     clotrimazole (LOTRIMIN) 1 % external cream Apply topically daily as needed For neck rash. Unknown     escitalopram (LEXAPRO) 10 MG tablet Take 10 mg by mouth daily 2/3/2023 at 0900     latanoprost (XALATAN) 0.005 % ophthalmic solution Place 1 drop into both eyes At Bedtime 2/3/2023 at 2000     lisinopril (ZESTRIL) 5 MG tablet Take 5 mg by mouth daily 2/3/2023 at 0900     menthol-zinc oxide (CALMOSEPTINE) 0.44-20.6 % OINT ointment Apply topically 2 times daily Apply to buttocks and coccyx area. 2/3/2023 at 2000     mineral oil-hydrophilic petrolatum (AQUAPHOR) external ointment Apply topically daily Apply to bilateral lower extremities for skin integrity. 2/3/2023 at 0900     multivitamin w/minerals (THERA-VIT-M) tablet Take 1 tablet by mouth daily 2/3/2023 at 0900     nystatin (MYCOSTATIN) 473769 UNIT/GM external powder Apply topically daily as needed Apply to skin folds/groin for yeast rash. Unknown     omeprazole (PRILOSEC) 20 MG DR capsule Take 20 mg by mouth daily 2/3/2023 at 0900     ondansetron (ZOFRAN ODT) 4 MG ODT tab Take 4 mg by mouth every 4 hours as needed for nausea Unknown     polyethylene glycol (MIRALAX) 17  g packet Take 1 packet by mouth daily as needed for constipation Unknown     pravastatin (PRAVACHOL) 40 MG tablet Take 40 mg by mouth At Bedtime 2/3/2023 at 2000     sennosides (SENOKOT) 8.6 MG tablet Take 2 tablets by mouth 2 times daily 2/3/2023 at 2000     tamsulosin (FLOMAX) 0.4 MG capsule Take 0.4 mg by mouth daily 2/3/2023 at 0900       Information source(s): Facility ([facility]/) medication list/MAR  Method of interview communication: N/A    Summary of Changes to PTA Med List  New: all listed  Discontinued: N/A  Changed: N/A    Patient was asked about OTC/herbal products specifically.  PTA med list reflects this.    In the past week, patient estimated taking medication this percent of the time:  greater than 90%.    Medication Affordability:  Not including over the counter (OTC) medications, was there a time in the past 12 months when you did not take your medications as prescribed because of cost?: Unable to Assess    Allergies were reviewed, assessed, and updated with the patient.      Patient did not bring any medications to the hospital and can't retrieve from home. No multi-dose medications are available for use during hospital stay.     The information provided in this note is only as accurate as the sources available at the time of the update(s).    Thank you for the opportunity to participate in the care of this patient.    Jacques Mayfield [facility]  2/4/2023 5:18 PM

## 2023-02-05 PROBLEM — F10.939 ALCOHOL WITHDRAWAL SYNDROME WITH COMPLICATION (H): Status: ACTIVE | Noted: 2023-01-01

## 2023-02-05 PROBLEM — F10.939 ALCOHOL WITHDRAWAL SYNDROME WITH COMPLICATION (H): Status: RESOLVED | Noted: 2023-01-01 | Resolved: 2023-01-01

## 2023-02-05 NOTE — PLAN OF CARE
Goal Outcome Evaluation:       Able to get CT with contrast done today. Well tolerated. SLP visited this afternoon - diet restarted. Required nursing assistant to feed him but did well - no signs of aspiration noted during dinner. Home meds resumed by BFM and given - crushable meds crushed and put with pudding but was able to swallow the flomax capsule whole. Up to recliner this afternoon via Kishor lift. Spoke with Steven to update - he would like someone to call him once CT results are posted. IV fluids continue at 125 ml/hr. Zosyn given as scheduled. IV's working well. Generally appears comfortable. Shows signs of some discomfort with log rolling but then quickly settles in appears comfortable. Incontinent of urine. No BM this shift.

## 2023-02-05 NOTE — PROGRESS NOTES
"Clinical Swallow Evaluation with Speech Pathology     02/05/23 1500   Appointment Info   Signing Clinician's Name / Credentials (SLP) Farnaz Kearns MA, CCC-SLP   General Information   Onset of Illness/Injury or Date of Surgery 02/04/23   Referring Physician Behm, Benita Kay, MD   Pertinent History of Current Problem Per provider documentation, \"Charles Mckeon is a 82 year old male admitted on 2/4/2023. 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  Patient clinically and vitally stable on broad-spectrum antibiotics.  Further work-up including imaging of head and neck pending.  Patient is DNR/DNI and does not wish to be put on pressor support in ICU per POA.  Hospice consulted at request of POA. Disposition pending clinical progress.\"   General Observations Patient alert. Limited speech output.   Type of Evaluation   Type of Evaluation Swallow Evaluation   Oral Motor   Oral Musculature unable to assess due to poor participation/comprehension   Mucosal Quality dry;sticky  (Thorough oral cares were provided prior to PO trial)   Dentition (Oral Motor)   Dentition (Oral Motor) adequate dentition   Facial Symmetry (Oral Motor)   Facial Symmetry (Oral Motor) unable/difficult to assess   Comment, Facial Symmetry (Oral Motor) Right facial weakness noted during PO trial.   Lip Function (Oral Motor)   Lip Range of Motion (Oral Motor) unable/difficult to assess   Lip Strength (Oral Motor) mild impairment   Comment, Lip Function (Oral Motor) Patient maintains open mouth posture at rest. Mildly reduced lip closure noted with PO intake.   Tongue Function (Oral Motor)   Tongue Strength (Oral Motor) unable/difficult to assess   Tongue Coordination/Speed (Oral Motor) unable/difficult to assess   Tongue ROM (Oral " Motor) unable/difficult to assess   Jaw Function (Oral Motor)   Jaw Function (Oral Motor) unable/difficult to assess   Comment, Jaw Function (Oral Motor) Jaw function appeared WFL during PO trial.   Cough/Swallow/Gag Reflex (Oral Motor)   Volitional Throat Clear/Cough (Oral Motor) unable/difficult to assess   Volitional Swallow (Oral Motor) unable/difficult to assess   Vocal Quality/Secretion Management (Oral Motor)   Vocal Quality (Oral Motor) WFL   Secretion Management (Oral Motor) WNL   Comment, Vocal Quality/Secretion Management (Oral Motor) Patient's vocal quality was clear.   General Swallowing Observations   Past History of Dysphagia Per on-call RN from patient's long term, he is on a diet of Mechanical Soft textures and nectar-thick liquids (now referred to as mildly thick). SLP learned of patient's modified diet after completion of clinical swallow evaluation. SLP was unable to locate information on prior swallow study in cursory review of previous records.   Respiratory Support (General Swallowing Observations) none  (O2 SATS 100% on room air)   Current Diet/Method of Nutritional Intake (General Swallowing Observations, NIS) NPO  (NPO until assessed by Speech Therapy)   Swallowing Evaluation Clinical swallow evaluation   Clinical Swallow Evaluation   Feeding Assistance dependent   Clinical Swallow Evaluation Textures Trialed thin liquids;pureed;solid foods   Clinical Swallow Eval: Thin Liquid Texture Trial   Mode of Presentation, Thin Liquids straw   Volume of Liquid or Food Presented 4 ounces   Oral Phase of Swallow WFL   Pharyngeal Phase of Swallow other (see comments)  (Occasional audible swallow sounds. No overt s/sx of aspiration.)   Diagnostic Statement Subjectively, swallow response appeared time. Occasional audible swallow sounds noted, which may be indicative of a dyscoordinated swallow. No overt clinical s/sx of aspiration observed.   Clinical Swallow Evaluation: Puree Solid Texture Trial   Mode of  Presentation, Puree fed by clinician   Volume of Puree Presented 2 ounces   Oral Phase, Puree WFL   Pharyngeal Phase, Puree intact  (No overt s/sx of aspiration)   Diagnostic Statement Oral prep was mildly dyscoordinated by adequate. No oral stasis noted after swallows. No overt clinical s/sx of aspiration observed.   Clinical Swallow Evaluation: Solid Food Texture Trial   Mode of Presentation fed by clinician   Volume Presented 4 bites  (1/2 hiwot cracker)   Oral Phase WFL   Pharyngeal Phase intact  (No overt s/sx of aspiration)   Diagnostic Statement Patient did not attempt to bite into the cracker despite verbal cues. SLP broke off small pieces and fed these to him. Patient chews almost exclusively on the left side of his mouth. Mastication was mildly dyscoordinated but functional. No significant oral stasis after swallows. No overt clinical s/sx of aspiration observed.   Esophageal Phase of Swallow   Patient reports or presents with symptoms of esophageal dysphagia No   Swallowing Recommendations   Diet Consistency Recommendations minced & moist (level 5);mildly thick liquids (level 2)   Supervision Level for Intake 1:1 supervision needed   Mode of Delivery Recommendations bolus size, small;slow rate of intake   Swallowing Maneuver Recommendations alternate food and liquid intake   Monitoring/Assistance Required (Eating/Swallowing) stop eating activities when fatigue is present;monitor for cough or change in vocal quality with intake;check mouth frequently for oral residue/pocketing   Recommended Feeding/Eating Techniques (Swallow Eval) maintain upright sitting position for eating;provide assist with feeding;minimize distractions during oral intake   Medication Administration Recommendations, Swallowing (SLP) Give pills crushed (as able) and mixed in puree.   General Therapy Interventions   Planned Therapy Interventions Dysphagia Treatment   Dysphagia treatment Modified diet education;Instruction of safe  "swallow strategies   Clinical Impression   Criteria for Skilled Therapeutic Interventions Met (SLP Eval) Yes, treatment indicated   SLP Diagnosis Oropharyngeal dysphagia   Risks & Benefits of therapy have been explained evaluation/treatment results reviewed;care plan/treatment goals reviewed;participants voiced agreement with care plan;participants included;patient  (Suspect limited true comprehension due to cognitive status.)   Clinical Impression Comments Clinical swallow evaluation completed per MD order. Patient presented with mild oral dysphagia. While there was no direct evidence of pharyngeal dysphagia during this evaluation, upon completion of study, SLP learned that patient is on a modified diet of Mechanical Soft textures and nectar-thick (equivalent of \"mildly thick\") liquids at his [facility]. At this time, recommend diet of Minced & Moist textures and mildly thick liquids. Patient will require 1:1 assistance with feeding. Staff to ensure that he is alert and sitting fully upright (preferably in chair) for all intake. Feed patient slowly (at his own pace) and offer small bites/sips. Speech Therapy will follow to monitor diet tolerance and trial advanced textures as appropriate.   SLP Total Evaluation Time   Eval: oral/pharyngeal swallow function, clinical swallow Minutes (93580) 17   SLP Goals   Therapy Frequency (SLP Eval) 4 times/wk   SLP Predicted Duration/Target Date for Goal Attainment 02/13/23   SLP Goals Swallow   SLP: Safely tolerate diet without signs/symptoms of aspiration Soft & bite sized diet;Mildly thick liquids;With use of swallow precautions;With assistance/supervision   Interventions   Interventions Quick Adds Swallowing Dysfunction   Swallowing Dysfunction &/or Oral Function for Feeding   Treatment of Swallowing Dysfunction &/or Oral Function for Feeding Minutes (30137) 8   SLP Discharge Planning   SLP Plan F/U re: diet tolerance. If concerns, consider VFSS. Trial Soft & Bite Sized and/or Easy " to Chew as appropriate.   SLP Discharge Recommendation home with assist  (Return to group home)   SLP Rationale for DC Rec Swallow function is impaired but may be at/near baseline.   SLP Brief overview of current status  Recommend diet of Minced & Moist textures and mildly thick liquids (Levels 5, 2). 1:1 assist w/ feeding. Pt to be alert and sitting fully upright for all intake. Give pills crushed (as able) and mixed in puree.   Total Session Time   Total Session Time (sum of timed and untimed services) 25

## 2023-02-05 NOTE — PROVIDER NOTIFICATION
Spoke with [facility] residents about recent creatinine level. Given creatinine is WNL, okay to proceed with CT scan this afternoon.

## 2023-02-05 NOTE — PLAN OF CARE
Problem: Plan of Care - These are the overarching goals to be used throughout the patient stay.    Goal: Optimal Comfort and Wellbeing  Outcome: Progressing  Intervention: Monitor Pain and Promote Comfort  Recent Flowsheet Documentation  Taken 2/5/2023 0445 by Rachel Joy RN  Pain Management Interventions:   repositioned   rest   pillow support provided  Taken 2/5/2023 0022 by Rachel Joy RN  Pain Management Interventions:   repositioned   rest   pillow support provided  Taken 2/4/2023 1956 by Rachel Joy RN  Pain Management Interventions: repositioned   Goal Outcome Evaluation:    Pt is alert when he arrived at the unit. Pt answers Yes or No questions but slow to respond. Words are incomprehensible. Mouth care provided. Pt incontinent of urine. Pt grimaces and moans when turned on his right side. Xray was done to elbow, appeared deformed, but no fractures seen. Skin has generalized rash/scratches all over body. Yeast infection noted to right armpit/upper arm. Cleansed area and Nystatin powder applied. Dressing to lower back intact where lumbar puncture was done. IVF, NS 0.9% running at 125 ml/hr.Rescheduled CT Facial bones w/ contrast at 9am instead of 4am. CT tech said it would require two persons to do the CT considering pt's condition. Dr. Behm instructed this staff to call MD if creatinine result is more than 1.5 before the CT. Will pass on to AM staff.

## 2023-02-05 NOTE — PHARMACY-VANCOMYCIN DOSING SERVICE
.Pharmacy Vancomycin Initial Note  Date of Service 2023  Patient's  1940  82 year old, male    Indication: Sepsis    Current estimated CrCl = Estimated Creatinine Clearance: 58.3 mL/min (based on SCr of 1.24 mg/dL).    Creatinine for last 3 days  2023:  2:20 PM Creatinine 1.24 mg/dL    Recent Vancomycin Level(s) for last 3 days  No results found for requested labs within last 72 hours.      Vancomycin IV Administrations (past 72 hours)                   vancomycin (VANCOCIN) 2,000 mg in 0.9% NaCl 500 mL intermittent infusion (mg) 2,000 mg New Bag 23 191                Nephrotoxins and other renal medications (From now, onward)    Start     Dose/Rate Route Frequency Ordered Stop    23 1900  vancomycin (VANCOCIN) 1,250 mg in 0.9% NaCl 250 mL intermittent infusion         1,250 mg  over 90 Minutes Intravenous EVERY 24 HOURS 23 2214            Contrast Orders - past 72 hours (72h ago, onward)    Start     Dose/Rate Route Frequency Stop    23 1600  iopamidol (ISOVUE-370) solution 80 mL         80 mL Intravenous ONCE 23 1623          [employer] Prediction of Planned Initial Vancomycin Regimen  Loading dose: N/A  Regimen: 1250 mg IV every 24 hours.  Start time: 07:12 on 2023  Exposure target: AUC24 (range)400-600 mg/L.hr   AUC24,ss: 490 mg/L.hr  Probability of AUC24 > 400: 72 %  Ctrough,ss: 15.2 mg/L  Probability of Ctrough,ss > 20: 26 %  Probability of nephrotoxicity (Lodise PETRA ): 10 %          Plan:  1. Following 2g load, continue vancomycin  1250 mg IV q24h. If treating for meningitis, would switch to traditional dose monitoring with goal trough of  15-20 mg/L  2. Vancomycin monitoring method: AUC  3. Vancomycin therapeutic monitoring goal: 400-600 mg*h/L  4. Pharmacy will check vancomycin levels as appropriate in 1-3 Days.    5. Serum creatinine levels will be ordered daily for the first week of therapy and at least twice weekly for subsequent weeks.       Rosamaria Gibson, [facility]

## 2023-02-05 NOTE — H&P
[facility]    History and Physical - Hospitalist Service    Date of Admission:  2/4/2023    Assessment and Plan  Charles Mckeon is a 82 year old male admitted on 2/4/2023. 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     Severe Sepsis  Pt presents w/ AMS, diaphoresis at [facility], hypotensive en route to ED that responded to IVF, tachycardic in ED, source unknown. Labs notable for elevated LA to 2.4 that improved to 1.7 after more IVF (likely was higher prior to IVF administration en route to ED), WBC 22 w/ ANC 19.6. UA unremarkable. Covid, influenza, RSV negative. CXR, CT head, neck, c/a/p did not reveal obvious source of infection though may be signs of acute ethmoid sinusitis which could explain exam findings. Patient does have neck stiffness (?baseline with kyphosis) concerning for meningitis, unable to get LP in ED x2 attempts. Patient was given IV zosyn, vanc in ED then addition of cefepime to cross blood-brain-barrier better for concern of CNS infection while in ED. Hospitalist exam revealed copious purulent secretions in oropharynx and erythematous oropharynx, while exudates overlying L tonsil, no meningeal signs, less concerned about CNS infection. CT head has artifact that obscures good visualization of oral structures.  - consider LP in AM with IR, concerned patient would not respond well to anesthesia at this time  - maxillofacial CT w/ contrast   - discussed with Radiology and CT staff, will have to wait until 0400 to repeat dose of IV contrast   - will recheck Cr prior to contrast administration, patient getting IVF  - follow blood cultures  - IV zosyn, vancomycin  - aerobic and anaerobic cultures of mouth collected  - rapid strep swab negative  - no agar for diptheria in our lab, wound need to contact [facility] to send tube for PCR  culture collection ([phone]) if indicated. TD vaccine given in 2015  - CBC, BMP in AM    R elbow pain and swelling  Exam concerning for dislocation, patient moans when area palpated, unable to verbalize.   - XR R elbow    Metabolic encephalopathy  Suspect due to sepsis  - NPO, IVF  - SLP eval    Progressive Decline  Alzheimer's  Per discussion with patient's POA, patient would not have wished to be in the ICU on pressor support, is DNR/DNI. Should patient's clinical status rapidly decline with poor prognosis and no response to non-intensive measures, patient will be transitioned to comfort cares per POA.   - hospice consult    Chronic Medical Conditions:  Will not continue PTA meds until evaluated by SLP    Gout  - hold PTA allopurinol  CVA  DVT  - hold PTA Eliquis, ASA  Constipation  - hold oral PTA laxatives/stool softeners  - continue PTA suppository prn  Anxiety  - hold PTA lexapro  HTN  - hold PTA lisinopril  Rash  - continue PTA clotrimazole, calmoseptine, aquaphor, nystatin  GERD  - hold PTA omeprazole   - IV pantoprazole while NPO  Nausea  - hold PTA zofran  - IV antiemetics prn  HLD  - hold PTA statin  BPH  - hold PTA tamsulosin      Diet: NPO for Medical/Clinical Reasons Except for: Ice Chips  DVT Prophylaxis: Enoxaparin (Lovenox) subcutaneous while NPO  Middleton Catheter: Not present  Fluids: IVF  Lines: None       Cardiac Monitoring: ACTIVE order. Indication: sepsis  Code Status: No CPR- Do NOT Intubate  POA:  Friend, Steven Hurt    Disposition Plan      Expected Discharge Date: 02/06/2023                The patient's care was discussed with the Attending Physician, Dr. Ferrara who will see the patient in the morning.     Benita Behm, MD PGY3  [facility]  Senior Pager: [phone], available 24/7  Text page via [facility] Paging/Directory ID# [phone].   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     Discussed with on-call RN at patient's Assisted Living facility and caretakers there were concerned patient had blood pressures of 80s/50s and tachycardic to 120s, did not get an accurate temperature, patient's shirt was soaked through with perspiration,was lethargic and more agitated. He has not been eating much in the past 2 days. He was not found to be incontinent. He would answer \"I don't know\" when asked if he was in pain.     Patient usually can answer yes or no to questions and can transfer to his wheelchair and use it as a walker but he has been progressively declining over the last month that he is shouting out more, more agitated since moving in 3 months ago. Caretakers at the [facility] and patient's POA, friend Steven, have been discussing hospice referral for progressive decline over past month. He was evaluated by PT at [facility] and they did not feel he would progress at all with ongoing therapy. The [facility] can accept patient back after discharge even if he discharges on hospice.     Per discussion with patient's POA: confirms code status, okay with further work-up if indicated, would like to have a discussion with hospice.     Review of Symptoms  The 10 point Review of Systems is negative other than noted in the HPI.    Past Medical History  I have reviewed this patient's medical history and updated it with pertinent information if needed.   Patient Active Problem List   Diagnosis     Cerebellar ataxia (H)     Coronary artery disease     Essential hypertension     Glaucoma     Gout     Pure hypercholesterolemia     Lactic acidosis     Acute encephalopathy     Sepsis, due to " unspecified organism, unspecified whether acute organ dysfunction present (H)     AC separation     C6 cervical fracture (H)     Closed fracture of frontal bone (H)     Closed fracture of second cervical vertebra (H)     Colitis     CVA (cerebral vascular accident) (H)     Deep vein thrombosis (DVT) of proximal lower extremity, unspecified chronicity, unspecified laterality (H)     Fracture of right clavicle     Fracture of right radius     GERD (gastroesophageal reflux disease)     Hand ulceration (H)     History of CVA with residual deficit     Incarcerated inguinal hernia     Left rib fracture     Nondisplaced fracture of distal phalanx of left thumb, initial encounter for closed fracture     Pancreatic lesion     Retention of urine     Schatzki's ring     Spigelian hernia     Subdural hematoma     Past Surgical History  I have reviewed this patient's surgical history and updated it with pertinent information if needed.  History reviewed. No pertinent surgical history.     Social History  I have reviewed this patient's social history and updated as needed.     Family History  Family History   Problem Relation Age of Onset     Diabetes Mother          at 70     Diabetes Father          at 60     Diabetes Brother          in an accident     Prior to Admission Medications  Current Outpatient Medications   Medication Instructions     acetaminophen (TYLENOL) 1,000 mg, Oral, 3 TIMES DAILY     allopurinol (ZYLOPRIM) 100 mg, Oral, DAILY     apixaban ANTICOAGULANT (ELIQUIS) 2.5 mg, Oral, 2 TIMES DAILY     aspirin (ASA) 81 mg, Oral, DAILY PRN     bisacodyl (DULCOLAX) 10 mg, Rectal, DAILY PRN     clotrimazole (LOTRIMIN) 1 % external cream Topical, DAILY PRN, For neck rash.     escitalopram (LEXAPRO) 10 mg, Oral, DAILY     latanoprost (XALATAN) 0.005 % ophthalmic solution 1 drop, Both Eyes, AT BEDTIME     lisinopril (ZESTRIL) 5 mg, Oral, DAILY     menthol-zinc oxide (CALMOSEPTINE) 0.44-20.6 % OINT ointment  Topical, 2 TIMES DAILY, Apply to buttocks and coccyx area.     mineral oil-hydrophilic petrolatum (AQUAPHOR) external ointment Topical, DAILY, Apply to bilateral lower extremities for skin integrity.     multivitamin w/minerals (THERA-VIT-M) tablet 1 tablet, Oral, DAILY     nystatin (MYCOSTATIN) 569438 UNIT/GM external powder Topical, DAILY PRN, Apply to skin folds/groin for yeast rash.     omeprazole (PRILOSEC) 20 mg, Oral, DAILY     ondansetron (ZOFRAN ODT) 4 mg, Oral, EVERY 4 HOURS PRN     polyethylene glycol (MIRALAX) 17 g packet 1 packet, Oral, DAILY PRN     pravastatin (PRAVACHOL) 40 mg, Oral, AT BEDTIME     sennosides (SENOKOT) 8.6 MG tablet 2 tablets, Oral, 2 TIMES DAILY     tamsulosin (FLOMAX) 0.4 mg, Oral, DAILY     Allergies  Allergies   Allergen Reactions     Naproxen Unknown     Other reaction(s): Bleeding       Physical Exam  Vital Signs: Temp: 98  F (36.7  C) Temp src: Oral BP: 130/57 Pulse: 81   Resp: 18 SpO2: 99 % O2 Device: None (Room air)    Weight: 205 lbs 0 oz    General appearance: alert, cooperative, appears stated age, kyphotic  Head: Normocephalic, without obvious abnormality, atraumatic  Eyes: conjunctivae/corneas clear. PERRL, EOM's intact.   Ears: hearing grossly intact  Nose: nares normal, septum midline, mucosa normal, no drainage  Throat: lips, mucosa, and tongue covered in dried purulent material, copious purulent material seen in mouth, no obvious dental abscess seen, some erythema at base of bottom L incisors and first molar, thick clear secretions and purulence seen in oropharynx, L tonsil does have a yellow film in spots, exam limited due to patient unable to open mouth fully and move tongue well. No lymphadenopathy noted  Lungs: clear to auscultation bilaterally, respirations unlabored  Chest wall: no tenderness to palpation  Heart: regular rate and rhythm, S1, S2 normal, no murmur, click, rub or gallop  Abdomen: soft, non-distended, ?tender in suprapubic region as patient  "response difficult to discern; bowel sounds normal; no masses, no organomegaly  Extremities: R elbow has anterior swelling that feels like bone, no overlying erythema, patient groans when this area is touched, no cyanosis or edema  Pulses: 2+ and symmetric  Skin: Skin color, texture, turgor normal. No rashes or lesions, non-diaphoretic  Neurologic: limited exam due to being non-verbal and limited ROM, does follow commands, can answer \"no\" and \"yea\" at times, no nuchal ridigity    Data  Data reviewed today: I reviewed all medications, new labs and imaging results over the last 24 hours.    ------------------------- PAST 24 HR DATA REVIEWED -----------------------------------------------    I have personally reviewed the following data over the past 24 hrs:    22.0 (H)  \   13.7   / 410     141 108 (H) 17 /  123   4.2 18 (L) 1.24 \       ALT: 22 AST: 43 (H) AP: 64 TBILI: 0.6   ALB: 3.6 TOT PROTEIN: 6.7 LIPASE: 45       Procal: 0.10 CRP: N/A Lactic Acid: 1.7       INR:  1.29 (H) PTT:  N/A   D-dimer:  N/A Fibrinogen:  N/A       Imaging results reviewed over the past 24 hrs:   Recent Results (from the past 24 hour(s))   XR Chest Port 1 View    Narrative    EXAM: XR CHEST PORT 1 VIEW  LOCATION: [facility]  DATE/TIME: 2/4/2023 2:36 PM    INDICATION: Hypotension, AMS  COMPARISON: 01/08/2019      Impression    IMPRESSION: Negative chest.   CT Chest/Abdomen/Pelvis w Contrast    Narrative    EXAM: CT CHEST/ABDOMEN/PELVIS W CONTRAST  LOCATION: [facility]  DATE/TIME: 2/4/2023 4:47 PM    INDICATION: AMS, leukocytosis, sepsis without source, stiffness of upper extremities  COMPARISON: None.  TECHNIQUE: CT scan of the chest, abdomen, and pelvis was performed following injection of IV contrast. Multiplanar reformats were obtained. Dose reduction techniques were used.   CONTRAST: 70ml Isovue 370    FINDINGS:   LUNGS AND PLEURA: Evaluation somewhat limited by respiratory motion. " Subcentimeter nodules bilaterally, for example 0.3 cm right lower lobe nodule (5/116), 0.7 cm nodule in the lingula (5/134) and 0.5 cm nodule superiorly in the left upper lobe (5/58).    MEDIASTINUM/AXILLAE: No lymphadenopathy. No thoracic aortic aneurysms.    CORONARY ARTERY CALCIFICATION: Moderate.    HEPATOBILIARY: Normal.    PANCREAS: 1.0 cm cystic lesion in the neck (4/150). No main duct dilation or solid component.    SPLEEN: Normal.    ADRENAL GLANDS: Normal.    KIDNEYS/BLADDER: No significant mass, stone, or hydronephrosis. Bladder is underdistended with circumferential wall thickening.    BOWEL: 3    LYMPH NODES: Normal.    VASCULATURE: Unremarkable.    PELVIC ORGANS: Normal.    MUSCULOSKELETAL: Tiny fat-containing umbilical hernia. Cystic lesion in the left anterior chest wall could be a sebaceous cyst. Degenerative changes in the spine. 3.4 x 2.9 cm fluid collection along the lateral aspect of the right gluteus svitlana   (4/283).      Impression    IMPRESSION:  1.  No acute findings or other explanation for leukocytosis.    2.  Small fluid collection partially imaged along the lateral aspect of the right gluteus svitlana suggestive of a chronic hematoma. Correlate with history.    3.  Incidental subcentimeter pulmonary nodules. Follow-up recommendations below:    REFERENCE:  Guidelines for Management of Incidental Pulmonary Nodules Detected on CT Images: From the Fleischner Society 2017.   Guidelines apply to incidental nodules in patients who are 35 years or older.  Guidelines do not apply to lung cancer screening, patients with immunosuppression, or patients with known primary cancer.    MULTIPLE NODULES  Nodule size 6 mm or larger  Low-risk patients: Follow-up CT at 3-6 months, then consider CT at 18-24 months.  High-risk patients: Follow-up CT at 3-6 months, then at 18-24 months if no change.    4.  Incidental 1.0 cm unilocular cystic lesion in the pancreatic neck. Follow-up recommendations  below:    REFERENCE:  Revisions of international consensus [place] guidelines for the management of IPMN of the pancreas. Pancreatology 2017;17(5):738-753.    Largest cyst between 10-20 mm: CT or MRI/MRCP every 6 months for 1 year and then yearly for 2 years and then every 2 years if no change.      Soft tissue neck CT w contrast    Narrative    EXAM: CT SOFT TISSUE NECK W CONTRAST  LOCATION: [facility]  DATE/TIME: 2/4/2023 4:47 PM    INDICATION: Neck and shoulder stiffness, AMS, leukocytosis  COMPARISON: None.  CONTRAST: 70ml Isovue 370  TECHNIQUE: Routine CT Soft Tissue Neck with IV contrast. Multiplanar reformats. Dose reduction techniques were used.    FINDINGS: Examination limited due to suboptimal patient positioning with accentuated kyphosis.  MUCOSAL SPACES/SOFT TISSUES: Mucosal surfaces of the oral cavity, pharynx, and larynx are suboptimally assessed due to artifact and positioning, including dental streak artifact obscuring portions of the oral tongue, the base of tongue, and the   oropharynx. No gross enhancing masslike lesion or faraz edema. No parapharyngeal space or subcutaneous soft tissue abnormality.    LYMPH NODES: No suspicious lymphadenopathy.     SALIVARY GLANDS: Parotid glands are symmetric and partially obscured. Submandibular glands are completely obscured by dental streak artifact.    THYROID: Homogenous. No mass.     VESSELS: Grossly patent.    VISUALIZED INTRACRANIAL/ORBITS/SINUSES: Diffuse cerebral volume loss in the partially visualized intracranial compartment. Bilateral lens replacements. Imaged sinuses and tympanomastoid cavities are clear.    OTHER: Lung apices are clear. No acute or aggressive osseous lesions. Multilevel degenerative changes in the visualized cervical spine.      Impression    IMPRESSION:  1.  Examination is rather limited due to suboptimal patient positioning and associated metallic streak artifact from dental amalgam.  2.  No obvious  masslike enhancement, lymphadenopathy, or faraz edema/fluid collection in the assessable soft tissues.       Head CT w/o contrast    Narrative    EXAM: CT HEAD W/O CONTRAST  LOCATION: [facility]  DATE/TIME: 2/4/2023 4:50 PM    INDICATION: Altered mental status. Quantitative decline.  COMPARISON: CT head 05/05/2013  TECHNIQUE: Routine CT Head without IV contrast. Multiplanar reformats. Dose reduction techniques were used.    FINDINGS:  INTRACRANIAL CONTENTS: Chronic cerebellar lacunar infarcts redemonstrated. No suggestion of acute transcortical infarct. No hemorrhage or extraaxial collection. No mass effect. Subarachnoid cisterns are patent. Patchy white matter hypodensities are,   while nonspecific, most compatible with chronic microvascular ischemic changes. Moderate generalized cerebral and cerebellar 3volume loss. No hydrocephalus.    VISUALIZED ORBITS/SINUSES/MASTOIDS: No visible intraorbital abnormality. Trace aerated secretions in the right posterior ethmoid air cell. No middle ear or mastoid effusion.    BONES/SOFT TISSUES: No acute abnormality.      Impression    IMPRESSION:  1.  No acute transcortical infarct, intracranial hemorrhage, or mass effect.   2.  Moderate diffuse cerebral volume loss and microangiopathic ischemic white matter changes.   3.  Chronic cerebellar lacunar infarcts.  4.  Trace aerated secretions in right posterior ethmoid air cell can be seen with acute sinusitis in appropriate clinical context.         NOTE: Data reviewed over the past 24 hrs contributes toward MDM complexity

## 2023-02-05 NOTE — PROGRESS NOTES
[facility]    Progress Note - Hospitalist Service       Date of Admission:  2/4/2023    Assessment & Plan   Charles Mckeon is a 82 year old male admitted on 2/4/2023. 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  Patient clinically and vitally stable on broad-spectrum antibiotics.  Further work-up including imaging of head and neck pending.  Patient is DNR/DNI and does not wish to be put on pressor support in ICU per POA.  Hospice consulted at request of POA. Disposition pending clinical progress.     Severe Sepsis  Presented from [facility] with altered mental status, hypotension, and tachycardia with findings of elevated lactate now resolved with fluids, leukocytosis of 22, and imaging findings suggestive of possible sinus infection as infectious source. CT head with artifact obscures good visualization of oral structures however secretions in oropharynx and erythematous oropharynx noted on exam.  CT CAP without acute findings but small fluid collection on right gluteus svitlana noted; exam of that area unremarkable. Sputum culture negative. Patient has baseline kyphosis and was unable to be positioned for lumbar puncture on 2 attempts in the emergency department.  Patient started on IV Zosyn and vancomycin and also received 1 dose of cefepime due to initial concern for CNS infection.  - IV zosyn and vancomycin  - maxillofacial CT w/ contrast pending  - consider head and neck ultrasound if suboptimal CT image  - consider LP with IR, however concerned patient would not respond well to anesthesia at this time  - follow blood cultures  - follow CBC  - follow CMP     Right elbow pain  Patient moans when area palpated, unable to verbalize. Right elbow x-ray with no acute findings  of fracture dislocation or hemarthrosis.  -Continue to monitor     Metabolic encephalopathy  Suspect due to sepsis.  Per correction staff, patient has been less responsive than his baseline.  -Follow labs  -Continue to monitor    Concern for dysphagia  Evaluated by SLP for dysphagia and was noted to have mild oral dysphagia with no direct evidence of pharyngeal dysphagia on evaluation.  -Minced and moist diet  -Mildly thick liquids  -mIVF until tolerating PO    Progressive decline  Alzheimer's Disease  Per discussion with patient's POA, patient would not have wished to be in the ICU on pressor support, is DNR/DNI. Should patient's clinical status rapidly decline with poor prognosis and no response to non-intensive measures, patient will be transitioned to comfort cares per POA.   - Hospice consult    Incidental cystic lesion of pancreas on CT  Incidental 1.0 cm unilocular cystic lesion in the pancreatic neck on CT.  -Follow up CT abdomen every 6 months for one year then yearly for 2 years then every 2 years if no change    Incidental pulmonary nodules on CT  Subcentimeter nodules noted bilaterally on CT.  -Follow-up CT chest at 3-6 months, then consider CT at 18-24 months       Chronic Medical Conditions:     Gout  - PTA allopurinol  CVA  DVT  - PTA Eliquis, ASA  Constipation  - oral PTA laxatives/stool softeners  Anxiety  - PTA lexapro  HTN  - PTA lisinopril  Rash  - continue PTA clotrimazole, calmoseptine, aquaphor, nystatin  GERD  - PTA omeprazole   - IV pantoprazole while NPO  Nausea  - PTA zofran  - IV antiemetics prn  HLD  - PTA statin  BPH  - PTA tamsulosin          Diet: Minced & Moist Diet (level 5) Mildly Thick (level 2)    DVT Prophylaxis: Eliquis  Middleton Catheter: Not present  Fluids: MIVF, PO  Lines: None     Cardiac Monitoring: ACTIVE order. Indication: sepsis  Code Status: No CPR- Do NOT Intubate      Clinically Significant Risk Factors Present on Admission              # Hypoalbuminemia: Lowest albumin = 3  g/dL at 2/5/2023 10:59 AM, will monitor as appropriate  # Drug Induced Coagulation Defect: home medication list includes an anticoagulant medication    # Hypertension: home medication list includes antihypertensive(s)              Disposition Plan     Expected Discharge Date: 02/06/2023                The patient's care was discussed with the Attending Physician, Dr. Ferrara.    Mallika Lenz MD  Hospitalist Service  [facility]  Securely message with [employer] (more info)  Text page via [employer] Paging/Directory   ______________________________________________________________________    Interval History   No acute events overnight. Incomprehensible language this morning. Per RN, may need muscle relaxant prior to CT imaging.     Physical Exam   Vital Signs: Temp: 98.1  F (36.7  C) Temp src: Axillary BP: 101/52 Pulse: 66   Resp: 16 SpO2: 99 % O2 Device: None (Room air)    Weight: 195 lbs 15.82 oz  General appearance: alert, cooperative, appears stated age, kyphotic  Head: Normocephalic, without obvious abnormality, atraumatic  Eyes: conjunctivae/corneas clear. PERRL, EOM's intact.   Ears: hearing grossly intact  Nose: nares normal, septum midline, mucosa normal, no drainage  Throat: mucosa and tongue covered in dried purulent material, yellow/brown material seen in mouth, no obvious dental abscess seen, some erythema at base of bottom L incisors and first molar, thick clear secretions seen in oropharynx, L tonsil does have a yellow film in spots, exam limited due to patient unable to open mouth fully and move tongue well. No lymphadenopathy noted  Lungs: clear to auscultation bilaterally, respirations unlabored  Chest wall: no tenderness to palpation  Heart: regular rate and rhythm, S1, S2 normal, no murmur, click, rub or gallop  Abdomen: soft, non-distended, non tender to palpation, bowel sounds normal; no masses, no organomegaly  Extremities: R elbow with overlying erythema, patient groans when  "this area is touched, no cyanosis or edema  Pulses: 2+ and symmetric  Skin: Skin color, texture, turgor normal. No rashes or lesions, non-diaphoretic  Neurologic: limited exam due to being non-verbal and limited ROM, does follow commands, can answer \"no\" and \"yea\" at times, no nuchal ridigity      Data     I have personally reviewed the following data over the past 24 hrs:    9.1  \   11.0 (L)   / 312     141 113 (H) 19 /  90   3.7 18 (L) 0.88 \       ALT: 45 AST: 108 (H) AP: 48 TBILI: 0.6   ALB: 3.0 (L) TOT PROTEIN: 5.5 (L) LIPASE: N/A       Procal: N/A CRP: 2.4 (H) Lactic Acid: 1.0       INR:  1.29 (H) PTT:  N/A   D-dimer:  N/A Fibrinogen:  N/A       "

## 2023-02-06 NOTE — PROGRESS NOTES
[facility]    Progress Note - Hospitalist Service       Date of Admission:  2/4/2023    Assessment & Plan   Charles Mckeon is a 82 year old male admitted on 2/4/2023. 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  Patient clinically and vitally stable on broad-spectrum antibiotics.  Further work-up including imaging of head and neck pending.  Patient is DNR/DNI and does not wish to be put on pressor support in ICU per POA.  Hospice consulted at request of POA. Disposition pending clinical progress.     Severe Sepsis - Resolved  Dental Abscess   Presented from [facility] with altered mental status, hypotension, and tachycardia with findings of elevated lactate now resolved with fluids, leukocytosis of 22, and imaging findings suggestive of possible sinus infection as infectious source. CT head with artifact obscures good visualization of oral structures however secretions in oropharynx and erythematous oropharynx noted on exam.  CT CAP without acute findings but small fluid collection on right gluteus svitlana noted; exam of that area unremarkable. Sputum culture negative. Patient has baseline kyphosis and was unable to be positioned for lumbar puncture on 2 attempts in the emergency department.  Patient started on IV Zosyn and vancomycin and also received 1 dose of cefepime due to initial concern for CNS infection.  - IV zosyn and vancomycin (2/4-2/6) transitioned to Augmentin   - maxillofacial CT demonstrated Left maxillary second molar periapical abscess with cortical erosion, no soft tissue abscess.  - consider LP with IR, however concerned patient would not respond well to anesthesia at this time  -Continue mouth care   -Blood cultures NGTD   -Daily CBC, CMP and  CRP    Metabolic encephalopathy  Suspect due to sepsis.  Per correction staff, patient has been less responsive than his baseline. Worsening Alzheimer's can also be contributory. Per POA at bedside today, patient seems to have improved from admission.   -Continue antibiotics as above      Right elbow pain  Patient moans when area palpated, unable to verbalize. Right hand weakness due to prior CVA.  Right elbow x-ray with no acute findings of fracture dislocation or hemarthrosis.  -Continue to monitor     Concern for dysphagia  Evaluated by SLP for dysphagia and was noted to have mild oral dysphagia with no direct evidence of pharyngeal dysphagia on evaluation.  -Currently on soft and bite sized diet, mildly thick with 1:1 assist  -Aspiration precautions    -mIVF until tolerating PO    Progressive decline  Alzheimer's Disease  Per discussion with patient's POA, patient would not have wished to be in the ICU on pressor support, is DNR/DNI. Should patient's clinical status rapidly decline with poor prognosis and no response to non-intensive measures, patient will be transitioned to comfort cares per POA.   - Hospice consulted     Incidental cystic lesion of pancreas on CT  Incidental 1.0 cm unilocular cystic lesion in the pancreatic neck on CT.  -Follow up CT abdomen every 6 months for one year then yearly for 2 years then every 2 years if no change    Incidental pulmonary nodules on CT  Subcentimeter nodules noted bilaterally on CT.  -Follow-up CT chest at 3-6 months, then consider CT at 18-24 months       Chronic Medical Conditions:  Gout  - PTA allopurinol  CVA  DVT  - PTA Eliquis, ASA  Constipation  - oral PTA laxatives/stool softeners  Anxiety  - PTA lexapro  HTN  - PTA lisinopril  Rash  - continue PTA clotrimazole, calmoseptine, aquaphor, nystatin  GERD  - Protonix therapeutic interchange for PTA omeprazole   Nausea  - PTA zofran  - IV antiemetics prn  HLD  - PTA statin  BPH  - PTA tamsulosin          Diet: Minced &  Moist Diet (level 5) Mildly Thick (level 2)    DVT Prophylaxis: Eliquis  [place] Catheter: Not present  Fluids: MIVF, PO  Lines: None     Cardiac Monitoring: ACTIVE order. Indication: sepsis  Code Status: No CPR- Do NOT Intubate      Clinically Significant Risk Factors              # Hypoalbuminemia: Lowest albumin = 2.8 g/dL at 2/6/2023  7:05 AM, will monitor as appropriate                    Disposition Plan      Expected Discharge Date: 02/07/2023        Discharge Comments: workup, on IV abx  Hospice consult to be completed        The patient's care was discussed with the Attending Physician, Dr. Fuentes.    Lakshmi Loya MD  Hospitalist Service  [facility]  Securely message with [employer] (more info)  Text page via [facility] Paging/Directory   ______________________________________________________________________    Interval History   No acute events overnight. Limited verbal communication but is able to follows commands. Says no to most ROS questions. Denies pain, difficulty breathing, chills.   Physical Exam   Vital Signs: Temp: 97.8  F (36.6  C) Temp src: Oral BP: 109/55 Pulse: 65   Resp: 16 SpO2: 99 % O2 Device: None (Room air)    Weight: 195 lbs 15.82 oz  General appearance: alert, cooperative, appears stated age  HEENT: Moist MM, poor dentition with extensive gum disease, no visible or palpable abscess   Lungs: clear to auscultation bilaterally, respirations unlabored on room air.   Heart: regular rate and rhythm, S1, S2 normal, no murmur, click, rub or gallop  Abdomen: soft, non-distended, non tender to palpation, bowel sounds normal; no masses, no organomegaly  Extremities: RUE stiff  Pulses: 2+ and symmetric  Skin: Skin color, texture, turgor normal. No rashes or lesions, non-diaphoretic  Neurologic: Limited verbal communication, following commands, RUE weakness from prior CVA, able to move all other extremities,        Data     I have personally reviewed the following data over the  past 24 hrs:

## 2023-02-06 NOTE — PLAN OF CARE
Problem: Risk for Delirium  Goal: Optimal Coping  Outcome: Progressing  Goal: Improved Behavioral Control  Outcome: Progressing  Intervention: Minimize Safety Risk  Recent Flowsheet Documentation  Taken 2/6/2023 0938 by Trupti Henriquez RN  Enhanced Safety Measures: bed alarm set  Goal: Improved Attention and Thought Clarity  Outcome: Progressing  Goal: Improved Sleep  Outcome: Progressing     Problem: Infection  Goal: Absence of Infection Signs and Symptoms  Outcome: Progressing   Goal Outcome Evaluation:       Pt disoriented x4 VSS. PRN miralax given on shift. Call light within reach and bed alarm on.

## 2023-02-06 NOTE — PLAN OF CARE
Problem: Plan of Care - These are the overarching goals to be used throughout the patient stay.    Goal: Optimal Comfort and Wellbeing  Outcome: Progressing  Intervention: Monitor Pain and Promote Comfort  Recent Flowsheet Documentation  Taken 2/6/2023 0408 by Rachel Joy RN  Pain Management Interventions: pillow support provided  Taken 2/6/2023 0000 by Rachel Joy RN  Pain Management Interventions: pillow support provided   Goal Outcome Evaluation:      Pt is alert and can answer Yes or No questions only. Pt unable to make a conversation. Grimaces and moans when turned to right his side. Right arm is contracted and moans when touch or being lifted away from body. Incontinent of urine. No bowel movement since admission. Has Laxatives prescribed. Mouth care done. Able to tolerate nectar thick fluid, gave apple juice.   On continuous IVF  ml/hr. BFM is aware of tooth abscess.

## 2023-02-06 NOTE — PROGRESS NOTES
Care Management Follow Up    Length of Stay (days): 2    Expected Discharge Date: 02/07/2023     Concerns to be Addressed:    Discharge needs.    Additional Information:  CM assisting with discharge. nursing home will be able to take patient back if he is on hospice. Hospice consult pending.  Have not heard outcome. Patient is not medically cleared for discharge, anticipate in 2 days or so. Will update nursing home.      Charlee Hyman RN

## 2023-02-07 NOTE — PLAN OF CARE
Goal Outcome Evaluation:    Outcome Evaluation: Pt Disoriented x4. Very minimal reponses. Incontinent of bowel and bladder. Bed/gown changed several times this shift. Purewick placed this evening. NS @ 125. Pills crushed in applesauce. Tele - SR with 1st Deg AVB, BBB. Q 2 turns. Alarms on for safety.

## 2023-02-07 NOTE — PLAN OF CARE
Pt disoriented, Incontinent bm and bladder. Q2 turns. Aspiration precautions.  Denies pain. Tongue very dry and white, request to notify MD by incoming shift. Total cares including feeding. Attempted male external catheter, was not appropriate for this pt, removed and checked/changed every few hours.  Hospice consult pending.   Problem: Risk for Delirium  Goal: Improved Behavioral Control  Intervention: Minimize Safety Risk  Recent Flowsheet Documentation  Taken 2/7/2023 0015 by Reyes, Dora, RN  Enhanced Safety Measures: bed alarm set

## 2023-02-07 NOTE — PLAN OF CARE
Problem: Risk for Delirium  Goal: Optimal Coping  Outcome: Progressing  Goal: Improved Behavioral Control  Outcome: Progressing  Intervention: Minimize Safety Risk  Recent Flowsheet Documentation  Taken 2/7/2023 1000 by Turpti Henriquez RN  Enhanced Safety Measures: bed alarm set  Goal: Improved Attention and Thought Clarity  Outcome: Progressing  Goal: Improved Sleep  Outcome: Progressing   Goal Outcome Evaluation:       VSS pt responses to yes/no questions. NS running at 125. Rounded with BFM nystatin medications ordered. Call light within reach and bed alarm on.

## 2023-02-07 NOTE — PROGRESS NOTES
Care Management Follow Up    Length of Stay (days): 3    Expected Discharge Date: 02/08/2023     Concerns to be Addressed:     Discharge planning    Additional Information:  CM following for discharge needs. Hospice consult today at 330pm. Patient can return back to [facility] tomorrow, preferred before 12 noon. [facility] is [facility]. Spoke with Baldo vidal RN for the facility and she says he can come back with hospice involvement. Fax orders to [phone]. Nurse number baldo rn, [phone]. More to be determined on 2/8/23.       Charlee Hyman, RN

## 2023-02-07 NOTE — PROGRESS NOTES
[facility]    Progress Note - Hospitalist Service       Date of Admission:  2/4/2023    Assessment & Plan   Charles Mckeon is a 82 year old male admitted on 2/4/2023. 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  Patient clinically and vitally stable on broad-spectrum antibiotics.  Further work-up including imaging of head and neck pending.  Patient is DNR/DNI and does not wish to be put on pressor support in ICU per POA.  Hospice consulted at request of POA. Disposition pending clinical progress.     Severe Sepsis - Resolved  Dental Abscess   Oral thrush   Presented from [facility] with altered mental status, hypotension, and tachycardia with findings of elevated lactate now resolved with fluids, leukocytosis of 22, and imaging findings suggestive of possible sinus infection as infectious source. CT head with artifact obscures good visualization of oral structures however secretions in oropharynx and erythematous oropharynx noted on exam.  CT CAP without acute findings but small fluid collection on right gluteus svitlana noted; exam of that area unremarkable. Sputum culture negative. Patient has baseline kyphosis and was unable to be positioned for lumbar puncture on 2 attempts in the emergency department.  Patient started on IV Zosyn and vancomycin and also received 1 dose of cefepime due to initial concern for CNS infection.  - IV zosyn and vancomycin (2/4-2/6) transitioned to Augmentin complete 14 day course   -Nystatin swab 4x daily for 7 days.   - maxillofacial CT demonstrated Left maxillary second molar periapical abscess with cortical erosion, no soft tissue abscess.  -Continue mouth care   -Blood cultures NGTD   -Daily CBC, CMP and CRP    Metabolic encephalopathy  -Improving   Suspect due to sepsis.  Per [facility] staff, patient has been less responsive than his baseline. Worsening Alzheimer's can also be contributory. Per POA who visited patient on 2/6/23, patient seems to have improved from admission.   -Continue antibiotics as above      Right elbow pain  Patient moans when area palpated, unable to verbalize. Right hand weakness due to prior CVA.  Right elbow x-ray with no acute findings of fracture dislocation or hemarthrosis.  -Continue to monitor     Concern for dysphagia  Evaluated by SLP for dysphagia and was noted to have mild oral dysphagia with no direct evidence of pharyngeal dysphagia on evaluation.  -Currently on soft and bite sized diet, mildly thick with 1:1 assist  -Aspiration precautions    -mIVF until increased PO    Progressive decline  Alzheimer's Disease  Per discussion with patient's POA, patient would not have wished to be in the ICU on pressor support, is DNR/DNI. Should patient's clinical status rapidly decline with poor prognosis and no response to non-intensive measures, patient will be transitioned to comfort cares per POA.   - Hospice consulted plan for meeting with POA today at 3:30 pm      Chronic Medical Conditions:  Gout -PTA allopurinol  CVA, DVT -PTA Eliquis, ASA  Constipation-Oral PTA laxatives/stool softeners  Anxiety- PTA lexapro  HTN-PTA lisinopril  Rash-Continue PTA clotrimazole, calmoseptine, aquaphor, nystatin  GERD -Protonix therapeutic interchange for PTA omeprazole   Nausea -PTA zofran, IV antiemetics prn  HLD- PTA statin  BPH- PTA tamsulosin    Incidental cystic lesion of pancreas on CT  Incidental 1.0 cm unilocular cystic lesion in the pancreatic neck on CT.  -Follow up CT abdomen every 6 months for one year then yearly for 2 years then every 2 years if no change    Incidental pulmonary nodules on CT  Subcentimeter nodules noted bilaterally on CT.  -Follow-up CT chest at 3-6 months, then consider CT at 18-24 months          Diet: Soft  & Bite Sized Diet (level 6) Mildly Thick (level 2)    DVT Prophylaxis: Eliquis  [place] Catheter: Not present  Fluids: MIVF, PO  Lines: None     Cardiac Monitoring: ACTIVE order. Indication: sepsis  Code Status: No CPR- Do NOT Intubate      Clinically Significant Risk Factors              # Hypoalbuminemia: Lowest albumin = 2.8 g/dL at 2/6/2023  7:05 AM, will monitor as appropriate                    Disposition Plan      Expected Discharge Date: 02/08/2023        Discharge Comments: now on PO abx  Hospice consult to be completed, outcome?        The patient's care was discussed with the Attending Physician, Dr. Fuentes.    Lakshmi Loya MD  Hospitalist Service  [facility]  Securely message with [employer] (more info)  Text page via [facility] Paging/Directory   ______________________________________________________________________    Interval History   No acute events overnight.  Of note patient has limited verbal communication but is able to follow commands. Says no to most ROS questions. Denies pain in the mouth, or elsewhere. Had bowel movement last night.      Physical Exam   Vital Signs: Temp: 98.3  F (36.8  C) Temp src: Axillary BP: 119/58 Pulse: 75   Resp: 20 SpO2: 98 % O2 Device: None (Room air)    Weight: 195 lbs 15.82 oz  General appearance: alert, cooperative, appears stated age  HEENT: Moist MM, poor dentition with extensive gum disease, no visible or palpable abscess, tried thickened white film on the tongue that does not come off when scraped   Lungs: clear to auscultation bilaterally, respirations unlabored on room air.   Heart: regular rate and rhythm, S1, S2 normal, no murmur, click, rub or gallop  Abdomen: soft, non-distended, non tender to palpation, bowel sounds normal; no masses, no organomegaly  Extremities: RUE stiff  Pulses: 2+ and symmetric  Skin: Skin color, texture, turgor normal. No rashes or lesions, non-diaphoretic  Neurologic: Limited verbal communication, following  commands, RUE weakness from prior CVA, able to move all other extremities,        Data     I have personally reviewed the following data over the past 24 hrs:

## 2023-02-08 PROBLEM — K04.7 DENTAL ABSCESS: Status: ACTIVE | Noted: 2023-01-01

## 2023-02-08 NOTE — PLAN OF CARE
Problem: Plan of Care - These are the overarching goals to be used throughout the patient stay.    Goal: Optimal Comfort and Wellbeing  Outcome: Progressing   Goal Outcome Evaluation:       VSS.  Denies nausea, SOB and pain.  Speech is minimal, but he is able to answer yes no questions .  Turned Q2.  External cath in place.  Alarm in place for safety.

## 2023-02-08 NOTE — DISCHARGE SUMMARY
[facility]  Discharge Summary - Medicine & Pediatrics       Date of Admission:  2/4/2023  Date of Discharge:  2/8/2023  Discharging Provider: Dr. Fuentes   Discharge Service: Hospitalist Service    Discharge Diagnoses   Principal Problem:    Sepsis, due to unspecified organism, unspecified whether acute organ dysfunction present (H) (2/4/2023)  Active Problems:    Acute encephalopathy (2/4/2023)    Dental abscess (2/8/2023)    Follow-ups Needed After Discharge   Follow up with Assisted living facility provider.     Unresulted Labs Ordered in the Past 30 Days of this Admission     Date and Time Order Name Status Description    2/4/2023  1:24 PM Blood Culture Arm, Left Preliminary     2/4/2023  1:24 PM Blood Culture Peripheral Blood Preliminary       These results will be followed up by primary team     Discharge Disposition   Discharged to assisted living  Condition at discharge: Stable    Hospital Course   Charles Mckeon is a 82 year old male admitted on 2/4/2023. 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     On presentation, he was hypotensive, and tachycardic with findings of elevated lactate that resolved with fluids, leukocytosis of 22. CT head did not show any acute infarcts but chronic infarcts and cerbral volume loss. CT CAP without acute findings but small fluid collection on right gluteus svitlana noted; exam of that area unremarkable. Sputum culture negative. Patient has baseline kyphosis and was unable to be positioned for lumbar puncture on 2 attempts in the emergency department. CT maxillofacial demonstrated Left maxillary second molar periapical abscess with cortical erosion, no soft tissue abscess. Sepsis likely due to the dental abscess.  Patient was started on IV Zosyn and vancomycin and also received 1 dose of cefepime in the ED due to initial concern for CNS infection at first. Continued on  IV zosyn and vancomycin (2/4-2/6) transitioned to Augmentin to complete 14 day course  Patient clinically and vitally stable with antibotics narrowed down to oral Augmentin    Sepsis -Resolved   Dental Abscess   Oral thrush   He was found to have oral thrush during this admission. . He was started on Nystatin swab 4x daily for 5 days. Perfomed dental care 4x daily.      Metabolic encephalopathy -Improved  Suspect due to sepsis.  Per [facility] staff, patient has been less responsive than his baseline prior to admission. Worsening Alzheimer's can also be contributory. Mental status improved from admission.     Right elbow pain  Patient moans when area palpated, unable to verbalize. Right hand weakness due to prior CVA.  Right elbow x-ray with no acute findings of fracture dislocation or hemarthrosis.     Concern for dysphagia  Patient has history of dysphagia. SLP was consulted and patient was placed on currently on soft and bite sized diet, mildly thick with 1:1 assist. Aspiration precautions     Progressive decline  Alzheimer's Disease  Per discussion with patient's POA, patient has been on a gradual decline over the past few months. Hospice will follow up with patient at the assisted living facility.      Incidental cystic lesion of pancreas on CT  Incidental 1.0 cm unilocular cystic lesion in the pancreatic neck on CT.  -Follow up CT abdomen every 6 months for one year then yearly for 2 years then every 2 years if no change     Incidental pulmonary nodules on CT  Subcentimeter nodules noted bilaterally on CT.  -Follow-up CT chest at 3-6 months, then consider CT at 18-24 months      Consultations This Hospital Stay   CARE MANAGEMENT / SOCIAL WORK IP CONSULT  PHARMACY TO DOSE [facility]  INPATIENT HOSPICE ADULT CONSULT  SPEECH LANGUAGE PATH ADULT IP CONSULT  PHARMACY TO  DOSE [facility]  PHARMACY TO DOSE [facility]    Code Status   No CPR- Do NOT Intubate       The patient was discussed with Dr. Alfredo Loya MD  [address] [address]  Phone: [phone]  Fax: [phone]  ______________________________________________________________________    Physical Exam   Vital Signs: Temp: 97.6  F (36.4  C) Temp src: Axillary BP: 124/58 Pulse: 69   Resp: 16 SpO2: 95 % O2 Device: None (Room air)    Weight: 195 lbs 15.82 oz  General appearance: alert, cooperative, appears stated age  HEENT: Moist MM, poor dentition with extensive gum disease, no visible or palpable abscess, improved white film on the tongue  Lungs: clear to auscultation bilaterally, respirations unlabored on room air.   Heart: regular rate and rhythm, S1, S2 normal, no murmur, click, rub or gallop  Abdomen: soft, non-distended, non tender to palpation, bowel sounds normal; no masses, no organomegaly  Extremities: RUE stiffness, otherwise no joint swelling   Pulses: 2+ and symmetric  Skin: Skin color, texture, turgor normal. No rashes or lesions, non-diaphoretic  Neurologic: Limited verbal communication, following commands, RUE weakness from prior CVA, able to move all other extremities,        Primary Care Physician   Rochelle Corey    Discharge Orders      General info for SNF    Length of Stay Estimate: Long Term Care  Condition at Discharge: Stable  Level of care:skilled   Rehabilitation Potential: Fair  Admission H&P remains valid and up-to-date: Yes  Recent Chemotherapy: N/A  Use Nursing Home Standing Orders: Yes     Follow Up and recommended labs and tests    Follow up with Nursing home physician.  No follow up labs or test are needed.     Reason for your hospital stay    Sepsis due to dental abscess     Activity - Up with nursing assistance     No CPR- Do NOT Intubate     Fall precautions     Diet    Follow this diet upon  discharge: Orders Placed This Encounter      Soft & Bite Sized Diet (level 6) Mildly Thick (level 2)       Significant Results and Procedures   Most Recent 3 CBC's:Recent Labs   Lab Test 02/08/23  0840 02/07/23  0806 02/06/23  0705   WBC 7.8 9.3 8.9   HGB 10.9* 11.2* 10.6*   MCV 99 99 99    297 288     Most Recent 3 BMP's:Recent Labs   Lab Test 02/08/23  0840 02/07/23  0806 02/06/23  0705    141 140   POTASSIUM 3.7 3.6 3.7   CHLORIDE 117* 118* 116*   CO2 16* 18* 17*   BUN 8 8 14   CR 0.65* 0.68* 0.77   ANIONGAP 8 5 7   ODALIS 7.9* 7.9* 8.0*   GLC 90 91 104     Most Recent 2 LFT's:Recent Labs   Lab Test 02/08/23  0840 02/07/23  0806   AST 63* 74*   ALT 42 44   ALKPHOS 42* 41*   BILITOTAL 0.4 0.4     Most Recent 3 INR's:Recent Labs   Lab Test 02/04/23  1638 06/22/21  0649 06/14/21  1037   INR 1.29* 1.90* 2.14*   ,   Results for orders placed or performed during the hospital encounter of 02/04/23   XR Chest Port 1 View    Narrative    EXAM: XR CHEST PORT 1 VIEW  LOCATION: [facility]  DATE/TIME: 2/4/2023 2:36 PM    INDICATION: Hypotension, AMS  COMPARISON: 01/08/2019      Impression    IMPRESSION: Negative chest.   Head CT w/o contrast    Narrative    EXAM: CT HEAD W/O CONTRAST  LOCATION: [facility]  DATE/TIME: 2/4/2023 4:50 PM    INDICATION: Altered mental status. Quantitative decline.  COMPARISON: CT head 05/05/2013  TECHNIQUE: Routine CT Head without IV contrast. Multiplanar reformats. Dose reduction techniques were used.    FINDINGS:  INTRACRANIAL CONTENTS: Chronic cerebellar lacunar infarcts redemonstrated. No suggestion of acute transcortical infarct. No hemorrhage or extraaxial collection. No mass effect. Subarachnoid cisterns are patent. Patchy white matter hypodensities are,   while nonspecific, most compatible with chronic microvascular ischemic changes. Moderate generalized cerebral and cerebellar 3volume loss. No hydrocephalus.    VISUALIZED  ORBITS/SINUSES/MASTOIDS: No visible intraorbital abnormality. Trace aerated secretions in the right posterior ethmoid air cell. No middle ear or mastoid effusion.    BONES/SOFT TISSUES: No acute abnormality.      Impression    IMPRESSION:  1.  No acute transcortical infarct, intracranial hemorrhage, or mass effect.   2.  Moderate diffuse cerebral volume loss and microangiopathic ischemic white matter changes.   3.  Chronic cerebellar lacunar infarcts.  4.  Trace aerated secretions in right posterior ethmoid air cell can be seen with acute sinusitis in appropriate clinical context.       Soft tissue neck CT w contrast    Addendum: 2/4/2023    Further discussion with provider indicates concerns for source for infection within the oral cavity, which is predominantly obscured on this CT exam. Note is made of a possible region of indeterminate rounded enhancement in the ventral oral tongue near   the anterior geniohyoid muscles, which may be artifact but should be correlated with exam. If further CT imaging is pursued, suggest emphasis to position in a way to minimize obscuration from streak artifact.    Bernard Coulter MD notified provider, DR. BEHM, of results via telephone at 2/4/2023 10:03 PM, who acknowledge understanding.      Narrative    EXAM: CT SOFT TISSUE NECK W CONTRAST  LOCATION: [facility]  DATE/TIME: 2/4/2023 4:47 PM    INDICATION: Neck and shoulder stiffness, AMS, leukocytosis  COMPARISON: None.  CONTRAST: 70ml Isovue 370  TECHNIQUE: Routine CT Soft Tissue Neck with IV contrast. Multiplanar reformats. Dose reduction techniques were used.    FINDINGS: Examination limited due to suboptimal patient positioning with accentuated kyphosis.  MUCOSAL SPACES/SOFT TISSUES: Mucosal surfaces of the oral cavity, pharynx, and larynx are suboptimally assessed due to artifact and positioning, including dental streak artifact obscuring portions of the oral tongue, the base of tongue, and the    oropharynx. No gross enhancing masslike lesion or faraz edema. No parapharyngeal space or subcutaneous soft tissue abnormality.    LYMPH NODES: No suspicious lymphadenopathy.     SALIVARY GLANDS: Parotid glands are symmetric and partially obscured. Submandibular glands are completely obscured by dental streak artifact.    THYROID: Homogenous. No mass.     VESSELS: Grossly patent.    VISUALIZED INTRACRANIAL/ORBITS/SINUSES: Diffuse cerebral volume loss in the partially visualized intracranial compartment. Bilateral lens replacements. Imaged sinuses and tympanomastoid cavities are clear.    OTHER: Lung apices are clear. No acute or aggressive osseous lesions. Multilevel degenerative changes in the visualized cervical spine.      Impression    IMPRESSION:  1.  Examination is rather limited due to suboptimal patient positioning and associated metallic streak artifact from dental amalgam.  2.  No obvious masslike enhancement, lymphadenopathy, or faraz edema/fluid collection in the assessable soft tissues.       CT Chest/Abdomen/Pelvis w Contrast    Narrative    EXAM: CT CHEST/ABDOMEN/PELVIS W CONTRAST  LOCATION: [facility]  DATE/TIME: 2/4/2023 4:47 PM    INDICATION: AMS, leukocytosis, sepsis without source, stiffness of upper extremities  COMPARISON: None.  TECHNIQUE: CT scan of the chest, abdomen, and pelvis was performed following injection of IV contrast. Multiplanar reformats were obtained. Dose reduction techniques were used.   CONTRAST: 70ml Isovue 370    FINDINGS:   LUNGS AND PLEURA: Evaluation somewhat limited by respiratory motion. Subcentimeter nodules bilaterally, for example 0.3 cm right lower lobe nodule (5/116), 0.7 cm nodule in the lingula (5/134) and 0.5 cm nodule superiorly in the left upper lobe (5/58).    MEDIASTINUM/AXILLAE: No lymphadenopathy. No thoracic aortic aneurysms.    CORONARY ARTERY CALCIFICATION: Moderate.    HEPATOBILIARY: Normal.    PANCREAS: 1.0 cm cystic  lesion in the neck (4/150). No main duct dilation or solid component.    SPLEEN: Normal.    ADRENAL GLANDS: Normal.    KIDNEYS/BLADDER: No significant mass, stone, or hydronephrosis. Bladder is underdistended with circumferential wall thickening.    BOWEL: 3    LYMPH NODES: Normal.    VASCULATURE: Unremarkable.    PELVIC ORGANS: Normal.    MUSCULOSKELETAL: Tiny fat-containing umbilical hernia. Cystic lesion in the left anterior chest wall could be a sebaceous cyst. Degenerative changes in the spine. 3.4 x 2.9 cm fluid collection along the lateral aspect of the right gluteus svitlana   (4/283).      Impression    IMPRESSION:  1.  No acute findings or other explanation for leukocytosis.    2.  Small fluid collection partially imaged along the lateral aspect of the right gluteus svitlana suggestive of a chronic hematoma. Correlate with history.    3.  Incidental subcentimeter pulmonary nodules. Follow-up recommendations below:    REFERENCE:  Guidelines for Management of Incidental Pulmonary Nodules Detected on CT Images: From the Fleischner Society 2017.   Guidelines apply to incidental nodules in patients who are 35 years or older.  Guidelines do not apply to lung cancer screening, patients with immunosuppression, or patients with known primary cancer.    MULTIPLE NODULES  Nodule size 6 mm or larger  Low-risk patients: Follow-up CT at 3-6 months, then consider CT at 18-24 months.  High-risk patients: Follow-up CT at 3-6 months, then at 18-24 months if no change.    4.  Incidental 1.0 cm unilocular cystic lesion in the pancreatic neck. Follow-up recommendations below:    REFERENCE:  Revisions of international consensus [place] guidelines for the management of IPMN of the pancreas. Pancreatology 2017;17(5):738-753.    Largest cyst between 10-20 mm: CT or MRI/MRCP every 6 months for 1 year and then yearly for 2 years and then every 2 years if no change.      XR Elbow Right G/E 3 Views    Narrative    EXAM: XR ELBOW RIGHT  G/E 3 VIEWS  LOCATION: [facility]  DATE/TIME: 2/4/2023 10:54 PM    INDICATION: concern for R elbow dislocation  COMPARISON: None.      Impression    IMPRESSION: No true lateral projection was performed. Minimal hypertrophic changes. No acute finding such as fracture, dislocation, or hemarthrosis seen.   CT Facial Bones with Contrast    Narrative    EXAM: CT FACIAL BONES WITH CONTRAST  LOCATION: [facility]  DATE/TIME: 2/5/2023 3:04 PM    INDICATION: concern for dental or oral abscess  COMPARISON: None.  CONTRAST: 75ml Isovue 370  TECHNIQUE: Routine CT Maxillofacial with IV contrast. Multiplanar reformats. Dose reduction techniques were used.     FINDINGS:  Imaging of oral cavity slightly limited due to dental amalgam artifact.    OSSEOUS STRUCTURES/SOFT TISSUES: Possibly mild localized soft tissue thickening overlying the lower lip. No underlying abscess. Moderate sized periapical abscess involving left maxillary second molar with cortical erosion of the buccal and lingual cortex   of the maxilla. No evidence of soft tissue abscess.    ORBITAL CONTENTS: No acute abnormality.    SINUSES: Mild reactive mucosal thickening left maxillary sinus.    VISUALIZED INTRACRANIAL CONTENTS: No acute abnormality.       Impression    IMPRESSION:   1.  No significant intraorbital abscess. Throat of mouth structures are grossly within normal limits.  2.  Left maxillary second molar periapical abscess with cortical erosion. No soft tissue abscess.       Discharge Medications   Current Discharge Medication List      START taking these medications    Details   amoxicillin-clavulanate (AUGMENTIN) 875-125 MG tablet Take 1 tablet by mouth every 12 hours for 10 days  Qty: 20 tablet, Refills: 0    Associated Diagnoses: Dental abscess      nystatin (MYCOSTATIN) 267411 UNIT/ML suspension Take 5 mLs (500,000 Units) by mouth 4 times daily for 3 days Please use swab to apply to mouth. Not for  swallowing given aspiration risk.  Qty: 60 mL, Refills: 0    Associated Diagnoses: Oral thrush         CONTINUE these medications which have NOT CHANGED    Details   acetaminophen (TYLENOL) 500 MG tablet Take 1,000 mg by mouth 3 times daily      allopurinol (ZYLOPRIM) 100 MG tablet Take 100 mg by mouth daily      apixaban ANTICOAGULANT (ELIQUIS) 2.5 MG tablet Take 2.5 mg by mouth 2 times daily      aspirin (ASA) 81 MG chewable tablet Take 81 mg by mouth daily as needed (for CAD)      bisacodyl (DULCOLAX) 10 MG suppository Place 10 mg rectally daily as needed for constipation      clotrimazole (LOTRIMIN) 1 % external cream Apply topically daily as needed For neck rash.      escitalopram (LEXAPRO) 10 MG tablet Take 10 mg by mouth daily      latanoprost (XALATAN) 0.005 % ophthalmic solution Place 1 drop into both eyes At Bedtime      lisinopril (ZESTRIL) 5 MG tablet Take 5 mg by mouth daily      menthol-zinc oxide (CALMOSEPTINE) 0.44-20.6 % OINT ointment Apply topically 2 times daily Apply to buttocks and coccyx area.      mineral oil-hydrophilic petrolatum (AQUAPHOR) external ointment Apply topically daily Apply to bilateral lower extremities for skin integrity.      multivitamin w/minerals (THERA-VIT-M) tablet Take 1 tablet by mouth daily      nystatin (MYCOSTATIN) 842813 UNIT/GM external powder Apply topically daily as needed Apply to skin folds/groin for yeast rash.      omeprazole (PRILOSEC) 20 MG DR capsule Take 20 mg by mouth daily      ondansetron (ZOFRAN ODT) 4 MG ODT tab Take 4 mg by mouth every 4 hours as needed for nausea      polyethylene glycol (MIRALAX) 17 g packet Take 1 packet by mouth daily as needed for constipation      pravastatin (PRAVACHOL) 40 MG tablet Take 40 mg by mouth At Bedtime      sennosides (SENOKOT) 8.6 MG tablet Take 2 tablets by mouth 2 times daily      tamsulosin (FLOMAX) 0.4 MG capsule Take 0.4 mg by mouth daily           Allergies   Allergies   Allergen Reactions     Naproxen  Unknown     Other reaction(s): Bleeding

## 2023-02-08 NOTE — PLAN OF CARE
Problem: Infection  Goal: Absence of Infection Signs and Symptoms  Outcome: Progressing   Goal Outcome Evaluation:             Afebrile, receiving oral antibiotics. Repositioning every 2 hours. Incontinent of bowels and bladder. Using pure wick.

## 2023-02-08 NOTE — PROGRESS NOTES
Care Management Discharge Note    Discharge Date: 02/08/2023       Discharge Disposition: intermediate  Discharge Services:      Discharge DME:      Discharge Transportation:  Agency ride    Private pay costs discussed: Not applicable    PAS Confirmation Code:    Patient/family educated on Medicare website which has current facility and service quality ratings:  (None indicated at this time)    Education Provided on the Discharge Plan:  Per team  Persons Notified of Discharge Plans: Patient, facility, Roz (POA)  Patient/Family in Agreement with the Plan: yes    Handoff Referral Completed: No    Additional Information:  Cm been assisting with discharge planning. Patient and friend, roz met with hospice yesterday for consult, and roz thinking they will consider an assessment once he gets back to his [facility]. Plan to return today, Denise updated and will see patient today. No other needs identified. Stretcher ride set for 1230pm.        Charlee Hyman RN

## 2023-02-09 NOTE — PROGRESS NOTES
[facility]    Background: Transitional Care Management program identified per system criteria and reviewed by [facility] team for possible outreach.    Assessment: Upon chart review, Psychiatric Team member will not proceed with patient outreach related to this episode of Transitional Care Management program due to reason below:    Patient has discharged to a Memory Care, Long-term Care, Assisted Living or Group Home where patient is receiving on-site support with their daily cares, including support with hospital follow up plan.     Patient has Alzheimer's Disease and resides in an [facility] with Memory and Hospice Care. No CHW outreach call needed at this time.    Plan: Transitional Care Management episode addressed appropriately per reason noted above.      WILLIAMS Humphrey  [phone]  [facility]    *Connected Care Resource Team does NOT follow patient ongoing. Referrals are identified based on internal discharge reports and the outreach is to ensure patient has an understanding of their discharge instructions.